# Patient Record
Sex: MALE | Race: WHITE | URBAN - METROPOLITAN AREA
[De-identification: names, ages, dates, MRNs, and addresses within clinical notes are randomized per-mention and may not be internally consistent; named-entity substitution may affect disease eponyms.]

---

## 2021-01-03 ENCOUNTER — HOSPITAL ENCOUNTER (INPATIENT)
Age: 46
LOS: 4 days | Discharge: HOME | DRG: 720 | End: 2021-01-07
Attending: EMERGENCY MEDICINE | Admitting: FAMILY MEDICINE
Payer: MEDICAID

## 2021-01-03 ENCOUNTER — APPOINTMENT (OUTPATIENT)
Dept: CT IMAGING | Age: 46
DRG: 720 | End: 2021-01-03
Payer: MEDICAID

## 2021-01-03 ENCOUNTER — APPOINTMENT (OUTPATIENT)
Dept: GENERAL RADIOLOGY | Age: 46
DRG: 720 | End: 2021-01-03
Payer: MEDICAID

## 2021-01-03 DIAGNOSIS — N30.01 ACUTE HEMORRHAGIC CYSTITIS: Primary | ICD-10-CM

## 2021-01-03 DIAGNOSIS — K20.90 ESOPHAGITIS: ICD-10-CM

## 2021-01-03 DIAGNOSIS — N17.9 ACUTE RENAL FAILURE, UNSPECIFIED ACUTE RENAL FAILURE TYPE (HCC): ICD-10-CM

## 2021-01-03 DIAGNOSIS — F10.11 HISTORY OF ALCOHOL ABUSE: ICD-10-CM

## 2021-01-03 DIAGNOSIS — R19.5 HEME POSITIVE STOOL: ICD-10-CM

## 2021-01-03 LAB
A/G RATIO: 0.9 (ref 1.1–2.2)
ALBUMIN SERPL-MCNC: 3.4 G/DL (ref 3.4–5)
ALP BLD-CCNC: 65 U/L (ref 40–129)
ALT SERPL-CCNC: 13 U/L (ref 10–40)
ANION GAP SERPL CALCULATED.3IONS-SCNC: 13 MMOL/L (ref 3–16)
AST SERPL-CCNC: 14 U/L (ref 15–37)
ATYPICAL LYMPHOCYTE RELATIVE PERCENT: 4 % (ref 0–6)
BACTERIA: ABNORMAL /HPF
BANDED NEUTROPHILS RELATIVE PERCENT: 2 % (ref 0–7)
BASOPHILS ABSOLUTE: 0 K/UL (ref 0–0.2)
BASOPHILS RELATIVE PERCENT: 0 %
BILIRUB SERPL-MCNC: 0.3 MG/DL (ref 0–1)
BILIRUBIN URINE: ABNORMAL
BLOOD, URINE: ABNORMAL
BUN BLDV-MCNC: 34 MG/DL (ref 7–20)
CALCIUM SERPL-MCNC: 9.2 MG/DL (ref 8.3–10.6)
CHLORIDE BLD-SCNC: 96 MMOL/L (ref 99–110)
CLARITY: ABNORMAL
CO2: 25 MMOL/L (ref 21–32)
COARSE CASTS, UA: ABNORMAL /LPF (ref 0–2)
COLOR: ABNORMAL
CREAT SERPL-MCNC: 2.4 MG/DL (ref 0.9–1.3)
EOSINOPHILS ABSOLUTE: 0.2 K/UL (ref 0–0.6)
EOSINOPHILS RELATIVE PERCENT: 1 %
EPITHELIAL CELLS, UA: ABNORMAL /HPF (ref 0–5)
GFR AFRICAN AMERICAN: 36
GFR NON-AFRICAN AMERICAN: 29
GLOBULIN: 3.7 G/DL
GLUCOSE BLD-MCNC: 99 MG/DL (ref 70–99)
GLUCOSE URINE: NEGATIVE MG/DL
HCT VFR BLD CALC: 44.9 % (ref 40.5–52.5)
HEMATOLOGY PATH CONSULT: YES
HEMOGLOBIN: 15.1 G/DL (ref 13.5–17.5)
INR BLD: 1.2 (ref 0.86–1.14)
KETONES, URINE: NEGATIVE MG/DL
LACTIC ACID: 0.9 MMOL/L (ref 0.4–2)
LEUKOCYTE ESTERASE, URINE: NEGATIVE
LYMPHOCYTES ABSOLUTE: 2.1 K/UL (ref 1–5.1)
LYMPHOCYTES RELATIVE PERCENT: 10 %
MCH RBC QN AUTO: 31.1 PG (ref 26–34)
MCHC RBC AUTO-ENTMCNC: 33.6 G/DL (ref 31–36)
MCV RBC AUTO: 92.7 FL (ref 80–100)
MICROSCOPIC EXAMINATION: YES
MONOCYTES ABSOLUTE: 2.4 K/UL (ref 0–1.3)
MONOCYTES RELATIVE PERCENT: 16 %
MYELOCYTE PERCENT: 1 %
NEUTROPHILS ABSOLUTE: 10.4 K/UL (ref 1.7–7.7)
NEUTROPHILS RELATIVE PERCENT: 66 %
NITRITE, URINE: NEGATIVE
OCCULT BLOOD DIAGNOSTIC: ABNORMAL
PDW BLD-RTO: 13.8 % (ref 12.4–15.4)
PH UA: 5 (ref 5–8)
PLATELET # BLD: 190 K/UL (ref 135–450)
PLATELET SLIDE REVIEW: ADEQUATE
PMV BLD AUTO: 8.2 FL (ref 5–10.5)
POTASSIUM REFLEX MAGNESIUM: 3.8 MMOL/L (ref 3.5–5.1)
PROTEIN UA: >=300 MG/DL
PROTHROMBIN TIME: 14 SEC (ref 10–13.2)
RBC # BLD: 4.84 M/UL (ref 4.2–5.9)
RBC # BLD: NORMAL 10*6/UL
RBC UA: >100 /HPF (ref 0–4)
SLIDE REVIEW: ABNORMAL
SODIUM BLD-SCNC: 134 MMOL/L (ref 136–145)
SPECIFIC GRAVITY UA: >=1.03 (ref 1–1.03)
TOTAL PROTEIN: 7.1 G/DL (ref 6.4–8.2)
URINE REFLEX TO CULTURE: YES
URINE TYPE: ABNORMAL
UROBILINOGEN, URINE: 1 E.U./DL
WBC # BLD: 15 K/UL (ref 4–11)
WBC UA: ABNORMAL /HPF (ref 0–5)

## 2021-01-03 PROCEDURE — 6360000002 HC RX W HCPCS: Performed by: EMERGENCY MEDICINE

## 2021-01-03 PROCEDURE — 2580000003 HC RX 258: Performed by: FAMILY MEDICINE

## 2021-01-03 PROCEDURE — 1200000000 HC SEMI PRIVATE

## 2021-01-03 PROCEDURE — 85610 PROTHROMBIN TIME: CPT

## 2021-01-03 PROCEDURE — 94760 N-INVAS EAR/PLS OXIMETRY 1: CPT

## 2021-01-03 PROCEDURE — G0328 FECAL BLOOD SCRN IMMUNOASSAY: HCPCS

## 2021-01-03 PROCEDURE — 87040 BLOOD CULTURE FOR BACTERIA: CPT

## 2021-01-03 PROCEDURE — 96374 THER/PROPH/DIAG INJ IV PUSH: CPT

## 2021-01-03 PROCEDURE — 80053 COMPREHEN METABOLIC PANEL: CPT

## 2021-01-03 PROCEDURE — 6370000000 HC RX 637 (ALT 250 FOR IP): Performed by: FAMILY MEDICINE

## 2021-01-03 PROCEDURE — 6370000000 HC RX 637 (ALT 250 FOR IP): Performed by: EMERGENCY MEDICINE

## 2021-01-03 PROCEDURE — 2580000003 HC RX 258: Performed by: EMERGENCY MEDICINE

## 2021-01-03 PROCEDURE — 6360000002 HC RX W HCPCS: Performed by: FAMILY MEDICINE

## 2021-01-03 PROCEDURE — 74176 CT ABD & PELVIS W/O CONTRAST: CPT

## 2021-01-03 PROCEDURE — 85025 COMPLETE CBC W/AUTO DIFF WBC: CPT

## 2021-01-03 PROCEDURE — 87086 URINE CULTURE/COLONY COUNT: CPT

## 2021-01-03 PROCEDURE — 99283 EMERGENCY DEPT VISIT LOW MDM: CPT

## 2021-01-03 PROCEDURE — 83605 ASSAY OF LACTIC ACID: CPT

## 2021-01-03 PROCEDURE — 96375 TX/PRO/DX INJ NEW DRUG ADDON: CPT

## 2021-01-03 PROCEDURE — 71045 X-RAY EXAM CHEST 1 VIEW: CPT

## 2021-01-03 PROCEDURE — 81001 URINALYSIS AUTO W/SCOPE: CPT

## 2021-01-03 PROCEDURE — C9113 INJ PANTOPRAZOLE SODIUM, VIA: HCPCS | Performed by: EMERGENCY MEDICINE

## 2021-01-03 RX ORDER — SODIUM CHLORIDE 0.9 % (FLUSH) 0.9 %
10 SYRINGE (ML) INJECTION EVERY 12 HOURS SCHEDULED
Status: DISCONTINUED | OUTPATIENT
Start: 2021-01-03 | End: 2021-01-07 | Stop reason: HOSPADM

## 2021-01-03 RX ORDER — ACETAMINOPHEN 325 MG/1
650 TABLET ORAL EVERY 6 HOURS PRN
Status: DISCONTINUED | OUTPATIENT
Start: 2021-01-03 | End: 2021-01-07 | Stop reason: HOSPADM

## 2021-01-03 RX ORDER — 0.9 % SODIUM CHLORIDE 0.9 %
1000 INTRAVENOUS SOLUTION INTRAVENOUS ONCE
Status: COMPLETED | OUTPATIENT
Start: 2021-01-03 | End: 2021-01-03

## 2021-01-03 RX ORDER — ACETAMINOPHEN 650 MG/1
650 SUPPOSITORY RECTAL EVERY 6 HOURS PRN
Status: DISCONTINUED | OUTPATIENT
Start: 2021-01-03 | End: 2021-01-07 | Stop reason: HOSPADM

## 2021-01-03 RX ORDER — POLYETHYLENE GLYCOL 3350 17 G/17G
17 POWDER, FOR SOLUTION ORAL DAILY PRN
Status: DISCONTINUED | OUTPATIENT
Start: 2021-01-03 | End: 2021-01-07 | Stop reason: HOSPADM

## 2021-01-03 RX ORDER — PROMETHAZINE HYDROCHLORIDE 25 MG/1
12.5 TABLET ORAL EVERY 6 HOURS PRN
Status: DISCONTINUED | OUTPATIENT
Start: 2021-01-03 | End: 2021-01-07 | Stop reason: HOSPADM

## 2021-01-03 RX ORDER — PANTOPRAZOLE SODIUM 40 MG/10ML
40 INJECTION, POWDER, LYOPHILIZED, FOR SOLUTION INTRAVENOUS DAILY
Status: DISCONTINUED | OUTPATIENT
Start: 2021-01-04 | End: 2021-01-05

## 2021-01-03 RX ORDER — SODIUM CHLORIDE 9 MG/ML
INJECTION, SOLUTION INTRAVENOUS CONTINUOUS
Status: DISCONTINUED | OUTPATIENT
Start: 2021-01-03 | End: 2021-01-07 | Stop reason: HOSPADM

## 2021-01-03 RX ORDER — MULTIVITAMIN WITH IRON
1 TABLET ORAL DAILY
Status: DISCONTINUED | OUTPATIENT
Start: 2021-01-03 | End: 2021-01-07 | Stop reason: HOSPADM

## 2021-01-03 RX ORDER — SODIUM CHLORIDE 9 MG/ML
INJECTION, SOLUTION INTRAVENOUS CONTINUOUS
Status: DISCONTINUED | OUTPATIENT
Start: 2021-01-03 | End: 2021-01-04

## 2021-01-03 RX ORDER — SODIUM CHLORIDE 0.9 % (FLUSH) 0.9 %
10 SYRINGE (ML) INJECTION PRN
Status: DISCONTINUED | OUTPATIENT
Start: 2021-01-03 | End: 2021-01-07 | Stop reason: HOSPADM

## 2021-01-03 RX ORDER — ONDANSETRON 2 MG/ML
4 INJECTION INTRAMUSCULAR; INTRAVENOUS EVERY 6 HOURS PRN
Status: DISCONTINUED | OUTPATIENT
Start: 2021-01-03 | End: 2021-01-03

## 2021-01-03 RX ORDER — CEPHALEXIN 500 MG/1
500 CAPSULE ORAL 3 TIMES DAILY
COMMUNITY

## 2021-01-03 RX ORDER — ACETAMINOPHEN 325 MG/1
650 TABLET ORAL ONCE
Status: COMPLETED | OUTPATIENT
Start: 2021-01-03 | End: 2021-01-03

## 2021-01-03 RX ORDER — ONDANSETRON 2 MG/ML
4 INJECTION INTRAMUSCULAR; INTRAVENOUS EVERY 6 HOURS PRN
Status: DISCONTINUED | OUTPATIENT
Start: 2021-01-03 | End: 2021-01-07 | Stop reason: HOSPADM

## 2021-01-03 RX ORDER — PANTOPRAZOLE SODIUM 40 MG/10ML
40 INJECTION, POWDER, LYOPHILIZED, FOR SOLUTION INTRAVENOUS ONCE
Status: COMPLETED | OUTPATIENT
Start: 2021-01-03 | End: 2021-01-03

## 2021-01-03 RX ADMIN — PANTOPRAZOLE SODIUM 40 MG: 40 INJECTION, POWDER, FOR SOLUTION INTRAVENOUS at 18:24

## 2021-01-03 RX ADMIN — Medication 1 G: at 18:30

## 2021-01-03 RX ADMIN — ONDANSETRON 4 MG: 2 INJECTION INTRAMUSCULAR; INTRAVENOUS at 16:01

## 2021-01-03 RX ADMIN — ACETAMINOPHEN 650 MG: 325 TABLET ORAL at 15:46

## 2021-01-03 RX ADMIN — SODIUM CHLORIDE 125 ML/HR: 9 INJECTION, SOLUTION INTRAVENOUS at 18:40

## 2021-01-03 RX ADMIN — ENOXAPARIN SODIUM 40 MG: 40 INJECTION SUBCUTANEOUS at 21:42

## 2021-01-03 RX ADMIN — SODIUM CHLORIDE 1000 ML: 9 INJECTION, SOLUTION INTRAVENOUS at 16:00

## 2021-01-03 RX ADMIN — SODIUM CHLORIDE: 9 INJECTION, SOLUTION INTRAVENOUS at 21:38

## 2021-01-03 RX ADMIN — THERA TABS 1 TABLET: TAB at 21:39

## 2021-01-03 ASSESSMENT — PAIN SCALES - GENERAL
PAINLEVEL_OUTOF10: 0
PAINLEVEL_OUTOF10: 0

## 2021-01-03 NOTE — ED NOTES
Bedside report given top [employer], pt. Transported to floor.      Anai Ruelas RN  01/03/21 8480

## 2021-01-03 NOTE — ED PROVIDER NOTES
EMERGENCY DEPARTMENT PROVIDER NOTE    Patient Identification  Pt Name: Je Alvares  MRN: [id number]  [place] 1975  Date of evaluation: 1/3/2021  Provider: Alden Teresa DO  PCP: No primary care provider on file. Chief Complaint  Fever (pt states he has been having fever since Monday. Today pt noticed his urine is dark and his stools are tarry. denies pain. pt states he has been taking motrin and aleve)      HPI  (History provided by patient)  This is a 41 y.o. male with pertinent past medical history of hypertension who was brought in by self for subjective fever ongoing for the past 6 days. Starting yesterday patient reported having blood in his urine, he went to an urgent care and was prescribed cephalexin which she has been taking without improvement. Nothing seems to make his symptoms any better or worse. Associated with dark stools patient noted today, as well as nausea and nonbloody vomiting. Patient states has been taking ibuprofen for his fevers over the past several days. Also reports history of heavy alcohol use, denies prior withdrawal symptoms. He denies any chest or abdominal pain. ROS    Const:  +fevers, no chills, no generalized weakness   Skin:  No rash, no lesions  Eyes:  No visual changes, no blurry or double vision, no pain  ENT:  No sore throat, no difficulty swallowing, no ear pain, no sinus pain or congestion  Card:  No chest pain, no palpitations, no edema  Resp:  No shortness of breath, no cough, no wheezing  Abd:  No abdominal pain, +nausea, +vomiting, no diarrhea, +tarry stools  Genitourinary:  No dysuria, +hematuria  MSK:  No joint pain, no myalgia  Neuro:  No focal weakness, no headache, no paresthesia    All other systems reviewed and negative unless otherwise noted in HPI        I have reviewed the following nursing documentation:  Allergies: Patient has no known allergies.     Past medical history:   Past Medical History:   Diagnosis Date    Hypertension Past surgical history: History reviewed. No pertinent surgical history. Home medications:   Previous Medications    CEPHALEXIN (KEFLEX) 500 MG CAPSULE    Take 500 mg by mouth 3 times daily    MULTIVITAMIN-IRON-MINERALS-FOLIC ACID (CENTRUM) CHEWABLE TABLET    Take 1 tablet by mouth daily    ONDANSETRON (ZOFRAN) 4 MG TABLET    Take 1 tablet by mouth every 8 hours as needed for Nausea or Vomiting       Social history:  reports that he has been smoking cigarettes. He has a 25.00 pack-year smoking history. He has never used smokeless tobacco. He reports current alcohol use of about 12.0 standard drinks of alcohol per week. He reports that he does not use drugs. Family history:  History reviewed. No pertinent family history. Exam  ED Triage Vitals [01/03/21 1241]   BP Temp Temp Source Pulse Resp SpO2 Height Weight   (!) 151/102 98.5 °F (36.9 °C) Oral 107 18 96 % 5' 9\" (1.753 m) 170 lb (77.1 kg)     Nursing note and vitals reviewed. Constitutional: Well developed, well nourished. Non-toxic in appearance. HENT:      Head: Normocephalic and atraumatic. Ears: External ears normal.      Nose: Nose normal.     Mouth: Membrane mucosa tacky and pink. Eyes: Anicteric sclera. No discharge. Neck: Supple. Trachea midline. Cardiovascular: Tachycardia, regular rhythm; no murmurs, rubs, or gallops. Pulmonary/Chest: Effort normal. No respiratory distress. CTAB. No stridor. No wheezes. No rales. Abdominal: Soft. No distension. Nontender to deep palpation all quadrants. No CVA tenderness. Rectal: Normal rectal tone, nontender, dark green stool, no hematochezia, Jaja Reese RN as chaperone  Musculoskeletal: Moves all extremities. No gross deformity. Neurological: Alert and oriented. Face symmetric. Speech is clear. Skin: Warm and dry. No rash. Psychiatric: Normal mood and affect.  Behavior is normal.              Radiology  CT ABDOMEN PELVIS WO CONTRAST Additional Contrast? None   Final Result   No acute intra-abdominal or pelvic abnormality. Persistent circumferential thickening of the distal esophagus may be   secondary to a soft a giant is. A neoplastic abnormality cannot be excluded   and direct visualization or esophagram is recommended for further evaluation. Colonic diverticulosis. No evidence of diverticulitis         XR CHEST PORTABLE   Final Result   No acute cardiopulmonary disease.              Labs  Results for orders placed or performed during the hospital encounter of 01/03/21   Urinalysis Reflex to Culture    Specimen: Urine, clean catch   Result Value Ref Range    Color, UA Brown Straw/Yellow    Clarity, UA CLOUDY (A) Clear    Glucose, Ur Negative Negative mg/dL    Bilirubin Urine MODERATE (A) Negative    Ketones, Urine Negative Negative mg/dL    Specific Gravity, UA >=1.030 1.005 - 1.030    Blood, Urine LARGE (A) Negative    pH, UA 5.0 5.0 - 8.0    Protein, UA >=300 (A) Negative mg/dL    Urobilinogen, Urine 1.0 <2.0 E.U./dL    Nitrite, Urine Negative Negative    Leukocyte Esterase, Urine Negative Negative    Microscopic Examination YES     Urine Type Voided     Urine Reflex to Culture Yes    Microscopic Urinalysis   Result Value Ref Range    Coarse Casts, UA 6-10 (A) 0 - 2 /LPF    WBC, UA 10-20 (A) 0 - 5 /HPF    RBC, UA >100 (A) 0 - 4 /HPF    Epithelial Cells, UA 2-5 0 - 5 /HPF    Bacteria, UA 3+ (A) None Seen /HPF   CBC Auto Differential   Result Value Ref Range    WBC 15.0 (H) 4.0 - 11.0 K/uL    RBC 4.84 4.20 - 5.90 M/uL    Hemoglobin 15.1 13.5 - 17.5 g/dL    Hematocrit 44.9 40.5 - 52.5 %    MCV 92.7 80.0 - 100.0 fL    MCH 31.1 26.0 - 34.0 pg    MCHC 33.6 31.0 - 36.0 g/dL    RDW 13.8 12.4 - 15.4 %    Platelets 551 083 - 595 K/uL    MPV 8.2 5.0 - 10.5 fL    PLATELET SLIDE REVIEW Adequate     SLIDE REVIEW see below     Path Consult Yes     Neutrophils % 66.0 %    Lymphocytes % 10.0 %    Monocytes % 16.0 %    Eosinophils % 1.0 %    Basophils % 0.0 %    Neutrophils Absolute 10.4 (H) 1.7 - 7.7 K/uL    Lymphocytes Absolute 2.1 1.0 - 5.1 K/uL    Monocytes Absolute 2.4 (H) 0.0 - 1.3 K/uL    Eosinophils Absolute 0.2 0.0 - 0.6 K/uL    Basophils Absolute 0.0 0.0 - 0.2 K/uL    Bands Relative 2 0 - 7 %    Atypical Lymphocytes Relative 4 0 - 6 %    Myelocyte Percent 1 (A) %    RBC Morphology Normal    Comprehensive Metabolic Panel w/ Reflex to MG   Result Value Ref Range    Sodium 134 (L) 136 - 145 mmol/L    Potassium reflex Magnesium 3.8 3.5 - 5.1 mmol/L    Chloride 96 (L) 99 - 110 mmol/L    CO2 25 21 - 32 mmol/L    Anion Gap 13 3 - 16    Glucose 99 70 - 99 mg/dL    BUN 34 (H) 7 - 20 mg/dL    CREATININE 2.4 (H) 0.9 - 1.3 mg/dL    GFR Non-African American 29 (A) >60    GFR  36 (A) >60    Calcium 9.2 8.3 - 10.6 mg/dL    Total Protein 7.1 6.4 - 8.2 g/dL    Alb 3.4 3.4 - 5.0 g/dL    Albumin/Globulin Ratio 0.9 (L) 1.1 - 2.2    Total Bilirubin 0.3 0.0 - 1.0 mg/dL    Alkaline Phosphatase 65 40 - 129 U/L    ALT 13 10 - 40 U/L    AST 14 (L) 15 - 37 U/L    Globulin 3.7 g/dL   Protime-INR   Result Value Ref Range    Protime 14.0 (H) 10.0 - 13.2 sec    INR 1.20 (H) 0.86 - 1.14   Lactic Acid, Plasma   Result Value Ref Range    Lactic Acid 0.9 0.4 - 2.0 mmol/L   Blood occult stool #1   Result Value Ref Range    Occult Blood Diagnostic Result: POSITIVE  Normal range: Negative   (A)        Screenings           MDM and ED Course    Patient afebrile and nontoxic. No distress. Abdomen is completely benign to palpation, no findings to suggest acute appendicitis, cholecystitis or pancreatitis and these are felt clinically unlikely diagnoses. Chest x-ray evidence of pneumothorax, infiltrate or other acute process. UA does show hematuria with elevated WBCs, potentially hemorrhagic cystitis although other etiology of hematuria cannot be excluded. No hematochezia on rectal exam, stools are not melanotic, however did return Hemoccult positive.   Laboratory work-up without evidence of anemia, brisk GI bleed is not suspected and there is no indication for PRBC transfusion at this time. Labs also with acute kidney injury, likely prerenal, no clinically significant electrolyte abnormalities. Given his hematuria and numerous lab abnormalities did proceed with CT scan of the abdomen which shows findings consistent with most likely esophagitis, no other acute process, EGD recommended to further characterize. Patient received IV fluid resuscitation, Protonix and ceftriaxone. Case discussed with internal medicine team and will admit for further evaluation and care. Recommend close monitoring of hemoglobin. Final Impression  1. Acute hemorrhagic cystitis    2. Acute renal failure, unspecified acute renal failure type ([address]    3. Heme positive stool    4. Esophagitis    5. History of alcohol abuse        Blood pressure (!) 151/102, pulse 107, temperature 98.5 °F (36.9 °C), temperature source Oral, resp. rate 18, height 5' 9\" (1.753 m), weight 170 lb (77.1 kg), SpO2 96 %. Disposition:  DISPOSITION Decision To Admit 01/03/2021 06:17:23 PM      Patient Referrals:  No follow-up provider specified. Discharge Medications:  New Prescriptions    No medications on file       Discontinued Medications:  Discontinued Medications    No medications on file       This chart was generated using the [employer] dictation system. I created this record but it may contain dictation errors given the limitations of this technology.     Ellen Hill DO (electronically signed)  Attending Emergency Physician       Ellen Hill DO  01/04/21 8518

## 2021-01-03 NOTE — H&P
HOSPITALISTS HISTORY AND PHYSICAL    1/3/2021 6:51 PM    Patient Information:  Amara Hill is a 41 y.o. male [id number]  PCP:  No primary care provider on file. (Tel: None )    Chief complaint:    Chief Complaint   Patient presents with    Fever     pt states he has been having fever since Monday. Today pt noticed his urine is dark and his stools are tarry. denies pain. pt states he has been taking motrin and aleve        History of Present Illness:  Geeta Macias is a 41 y.o. male iwith current daily alcohol use, nicotine use, HTN Presents with c/o intermittent  fever since Monday. He also noticed blood in the urine and dark tarry stool. He was seen at urgent care and stared on Keflex for possible urinary tract infection. UA showed UTI. CT abdomen showed esophageal wall thickening. takes motrin for pain . No recent scope    REVIEW OF SYSTEMS:   Constitutional: +Ve for fever,chills or night sweats  ENT: Negative for rhinorrhea, epistaxis, hoarseness, sore throat. Respiratory: Negative for shortness of breath,wheezing  Cardiovascular: Negative for chest pain, palpitations   Gastrointestinal: Negative for nausea, vomiting, diarrhea  Genitourinary: Negative for polyuria, dysuria   Hematologic/Lymphatic: Negative for bleeding tendency, easy bruising  Musculoskeletal: Negative for myalgias and arthralgias  Neurologic: Negative for confusion,dysarthria. Skin: Negative for itching,rash  Psychiatric: Negative for depression,anxiety, agitation. Endocrine: Negative for polydipsia,polyuria,heat /cold intolerance. Past Medical History:   has a past medical history of Hypertension. Past Surgical History:   has no past surgical history on file. Medications:  No current facility-administered medications on file prior to encounter.       Current Outpatient Medications on File Prior to Encounter   Medication Sig Dispense Refill    cephALEXin (KEFLEX) 500 MG capsule Take 500 mg by mouth 3 times daily      multivitamin-iron-minerals-folic acid (CENTRUM) chewable tablet Take 1 tablet by mouth daily      ondansetron (ZOFRAN) 4 MG tablet Take 1 tablet by mouth every 8 hours as needed for Nausea or Vomiting 12 tablet 0       Allergies:  No Known Allergies     Social History:   reports that he has been smoking cigarettes. He has a 25.00 pack-year smoking history. He has never used smokeless tobacco. He reports current alcohol use of about 12.0 standard drinks of alcohol per week. He reports that he does not use drugs. Family History:  family history is not on file. , family history reviewed not pertinent to current admission    Physical Exam:  BP (!) 155/96   Pulse 88   Temp 98.5 °F (36.9 °C) (Oral)   Resp 16   Ht 5' 9\" (1.753 m)   Wt 170 lb (77.1 kg)   SpO2 96%   BMI 25.10 kg/m²     General appearance:  Appears comfortable. Well nourished  Eyes: Sclera clear, pupils equal  ENT: Moist mucus membranes, no thrush. Trachea midline. Cardiovascular: Regular rhythm, normal S1, S2. No murmur, gallop, rub. No edema in lower extremities  Respiratory: Clear to auscultation bilaterally, no wheeze, good inspiratory effort  Gastrointestinal: Abdomen soft, non-tender, not distended, normal bowel sounds  Musculoskeletal: No cyanosis in digits, neck supple  Neurology: Cranial nerves grossly intact. Alert and oriented in time, place and person. No speech or motor deficits  Psychiatry: Appropriate affect.  Not agitated  Skin: Warm, dry, normal turgor, no rash    Labs:  CBC:   Lab Results   Component Value Date    WBC 15.0 01/03/2021    RBC 4.84 01/03/2021    HGB 15.1 01/03/2021    HCT 44.9 01/03/2021    MCV 92.7 01/03/2021    MCH 31.1 01/03/2021    MCHC 33.6 01/03/2021    RDW 13.8 01/03/2021     01/03/2021    MPV 8.2 01/03/2021     BMP:    Lab Results   Component Value Date     01/03/2021    K 3.8 01/03/2021    CL 96 01/03/2021    CO2 25 01/03/2021    BUN 34 01/03/2021    CREATININE 2.4 01/03/2021    CALCIUM 9.2 01/03/2021    GFRAA 36 01/03/2021    LABGLOM 29 01/03/2021    GLUCOSE 99 01/03/2021       Chest Xray:   EKG:        Problem List  Active Problems:    RACIEL (acute kidney injury) ([facility] [address]  Resolved Problems:    * No resolved hospital problems. *        Assessment/Plan:         1. RACIEL likely d/t dehydration  Cr up to 2.4  Cont to hydrate  Avoid nephrotoxins  Cont to monitor    Hematochezia  Source likely upper GI  CT abdomen showed esophageal thickening  Started on protonix  Cont to monitor h/h   Hb is 15 at this time  NPO past midnight  GI consulted     Hematuria concern for cystitis  Cultures pending  Will treat with abx for now    etoh use  Cont multivitamin  etoh level pending    Admit as inpatient. I anticipate hospitalization spanning more than two midnights for investigation and treatment of the above medically necessary diagnoses.       Theresa Barrera MD    1/3/2021 6:51 PM

## 2021-01-04 PROBLEM — F10.10 ALCOHOL ABUSE, CONTINUOUS: Status: ACTIVE | Noted: 2021-01-04

## 2021-01-04 PROBLEM — K92.1 MELENA: Status: ACTIVE | Noted: 2021-01-04

## 2021-01-04 PROBLEM — R50.9 FEVER: Status: ACTIVE | Noted: 2021-01-04

## 2021-01-04 PROBLEM — K22.89 ESOPHAGEAL THICKENING: Status: ACTIVE | Noted: 2021-01-04

## 2021-01-04 PROBLEM — Z72.0 TOBACCO ABUSE: Status: ACTIVE | Noted: 2021-01-04

## 2021-01-04 LAB
AMPHETAMINE SCREEN, URINE: NORMAL
ANION GAP SERPL CALCULATED.3IONS-SCNC: 11 MMOL/L (ref 3–16)
BARBITURATE SCREEN URINE: NORMAL
BASOPHILS ABSOLUTE: 0 K/UL (ref 0–0.2)
BASOPHILS RELATIVE PERCENT: 0.4 %
BENZODIAZEPINE SCREEN, URINE: NORMAL
BUN BLDV-MCNC: 32 MG/DL (ref 7–20)
CALCIUM SERPL-MCNC: 8.3 MG/DL (ref 8.3–10.6)
CANNABINOID SCREEN URINE: NORMAL
CHLORIDE BLD-SCNC: 103 MMOL/L (ref 99–110)
CO2: 23 MMOL/L (ref 21–32)
COCAINE METABOLITE SCREEN URINE: NORMAL
CREAT SERPL-MCNC: 2.1 MG/DL (ref 0.9–1.3)
EOSINOPHILS ABSOLUTE: 0.1 K/UL (ref 0–0.6)
EOSINOPHILS RELATIVE PERCENT: 1.2 %
GFR AFRICAN AMERICAN: 42
GFR NON-AFRICAN AMERICAN: 34
GLUCOSE BLD-MCNC: 93 MG/DL (ref 70–99)
HCT VFR BLD CALC: 39.7 % (ref 40.5–52.5)
HEMATOLOGY PATH CONSULT: NORMAL
HEMOGLOBIN: 13.1 G/DL (ref 13.5–17.5)
LACTIC ACID, SEPSIS: 0.7 MMOL/L (ref 0.4–1.9)
LYMPHOCYTES ABSOLUTE: 1.2 K/UL (ref 1–5.1)
LYMPHOCYTES RELATIVE PERCENT: 11.7 %
Lab: NORMAL
MCH RBC QN AUTO: 30.7 PG (ref 26–34)
MCHC RBC AUTO-ENTMCNC: 32.9 G/DL (ref 31–36)
MCV RBC AUTO: 93.5 FL (ref 80–100)
METHADONE SCREEN, URINE: NORMAL
MONOCYTES ABSOLUTE: 1.4 K/UL (ref 0–1.3)
MONOCYTES RELATIVE PERCENT: 13 %
NEUTROPHILS ABSOLUTE: 7.8 K/UL (ref 1.7–7.7)
NEUTROPHILS RELATIVE PERCENT: 73.7 %
OPIATE SCREEN URINE: NORMAL
OXYCODONE URINE: NORMAL
PDW BLD-RTO: 13.6 % (ref 12.4–15.4)
PH UA: 7
PHENCYCLIDINE SCREEN URINE: NORMAL
PLATELET # BLD: 183 K/UL (ref 135–450)
PMV BLD AUTO: 7.8 FL (ref 5–10.5)
POTASSIUM SERPL-SCNC: 4.1 MMOL/L (ref 3.5–5.1)
PROPOXYPHENE SCREEN: NORMAL
RBC # BLD: 4.25 M/UL (ref 4.2–5.9)
SARS-COV-2, NAAT: NOT DETECTED
SODIUM BLD-SCNC: 137 MMOL/L (ref 136–145)
URINE CULTURE, ROUTINE: NORMAL
WBC # BLD: 10.6 K/UL (ref 4–11)

## 2021-01-04 PROCEDURE — 80307 DRUG TEST PRSMV CHEM ANLYZR: CPT

## 2021-01-04 PROCEDURE — 94760 N-INVAS EAR/PLS OXIMETRY 1: CPT

## 2021-01-04 PROCEDURE — C9113 INJ PANTOPRAZOLE SODIUM, VIA: HCPCS | Performed by: FAMILY MEDICINE

## 2021-01-04 PROCEDURE — U0002 COVID-19 LAB TEST NON-CDC: HCPCS

## 2021-01-04 PROCEDURE — 83605 ASSAY OF LACTIC ACID: CPT

## 2021-01-04 PROCEDURE — 1200000000 HC SEMI PRIVATE

## 2021-01-04 PROCEDURE — 36415 COLL VENOUS BLD VENIPUNCTURE: CPT

## 2021-01-04 PROCEDURE — 6360000002 HC RX W HCPCS: Performed by: FAMILY MEDICINE

## 2021-01-04 PROCEDURE — 6360000002 HC RX W HCPCS: Performed by: INTERNAL MEDICINE

## 2021-01-04 PROCEDURE — 80048 BASIC METABOLIC PNL TOTAL CA: CPT

## 2021-01-04 PROCEDURE — 85025 COMPLETE CBC W/AUTO DIFF WBC: CPT

## 2021-01-04 PROCEDURE — 2580000003 HC RX 258: Performed by: FAMILY MEDICINE

## 2021-01-04 RX ORDER — LORAZEPAM 1 MG/1
2 TABLET ORAL
Status: DISCONTINUED | OUTPATIENT
Start: 2021-01-04 | End: 2021-01-07 | Stop reason: HOSPADM

## 2021-01-04 RX ORDER — SODIUM CHLORIDE 0.9 % (FLUSH) 0.9 %
10 SYRINGE (ML) INJECTION PRN
Status: DISCONTINUED | OUTPATIENT
Start: 2021-01-04 | End: 2021-01-07 | Stop reason: HOSPADM

## 2021-01-04 RX ORDER — LORAZEPAM 2 MG/ML
4 INJECTION INTRAMUSCULAR
Status: DISCONTINUED | OUTPATIENT
Start: 2021-01-04 | End: 2021-01-07 | Stop reason: HOSPADM

## 2021-01-04 RX ORDER — LORAZEPAM 2 MG/ML
1 INJECTION INTRAMUSCULAR
Status: DISCONTINUED | OUTPATIENT
Start: 2021-01-04 | End: 2021-01-07 | Stop reason: HOSPADM

## 2021-01-04 RX ORDER — LORAZEPAM 2 MG/ML
2 INJECTION INTRAMUSCULAR
Status: DISCONTINUED | OUTPATIENT
Start: 2021-01-04 | End: 2021-01-07 | Stop reason: HOSPADM

## 2021-01-04 RX ORDER — SODIUM CHLORIDE 0.9 % (FLUSH) 0.9 %
10 SYRINGE (ML) INJECTION EVERY 12 HOURS SCHEDULED
Status: DISCONTINUED | OUTPATIENT
Start: 2021-01-04 | End: 2021-01-07 | Stop reason: HOSPADM

## 2021-01-04 RX ORDER — LORAZEPAM 1 MG/1
4 TABLET ORAL
Status: DISCONTINUED | OUTPATIENT
Start: 2021-01-04 | End: 2021-01-07 | Stop reason: HOSPADM

## 2021-01-04 RX ORDER — THIAMINE HYDROCHLORIDE 100 MG/ML
100 INJECTION, SOLUTION INTRAMUSCULAR; INTRAVENOUS DAILY
Status: DISCONTINUED | OUTPATIENT
Start: 2021-01-04 | End: 2021-01-07

## 2021-01-04 RX ORDER — HYDRALAZINE HYDROCHLORIDE 20 MG/ML
10 INJECTION INTRAMUSCULAR; INTRAVENOUS EVERY 4 HOURS PRN
Status: DISCONTINUED | OUTPATIENT
Start: 2021-01-04 | End: 2021-01-07 | Stop reason: HOSPADM

## 2021-01-04 RX ORDER — LORAZEPAM 1 MG/1
1 TABLET ORAL
Status: DISCONTINUED | OUTPATIENT
Start: 2021-01-04 | End: 2021-01-07 | Stop reason: HOSPADM

## 2021-01-04 RX ORDER — LORAZEPAM 2 MG/ML
3 INJECTION INTRAMUSCULAR
Status: DISCONTINUED | OUTPATIENT
Start: 2021-01-04 | End: 2021-01-07 | Stop reason: HOSPADM

## 2021-01-04 RX ORDER — LORAZEPAM 1 MG/1
3 TABLET ORAL
Status: DISCONTINUED | OUTPATIENT
Start: 2021-01-04 | End: 2021-01-07 | Stop reason: HOSPADM

## 2021-01-04 RX ORDER — HALOPERIDOL 5 MG/ML
5 INJECTION INTRAMUSCULAR EVERY 4 HOURS PRN
Status: DISCONTINUED | OUTPATIENT
Start: 2021-01-04 | End: 2021-01-07 | Stop reason: HOSPADM

## 2021-01-04 RX ADMIN — PANTOPRAZOLE SODIUM 40 MG: 40 INJECTION, POWDER, FOR SOLUTION INTRAVENOUS at 08:02

## 2021-01-04 RX ADMIN — THIAMINE HYDROCHLORIDE 100 MG: 100 INJECTION, SOLUTION INTRAMUSCULAR; INTRAVENOUS at 16:19

## 2021-01-04 RX ADMIN — HYDRALAZINE HYDROCHLORIDE 10 MG: 20 INJECTION INTRAMUSCULAR; INTRAVENOUS at 21:50

## 2021-01-04 RX ADMIN — HYDRALAZINE HYDROCHLORIDE 10 MG: 20 INJECTION INTRAMUSCULAR; INTRAVENOUS at 15:13

## 2021-01-04 RX ADMIN — Medication 1 G: at 17:15

## 2021-01-04 RX ADMIN — SODIUM CHLORIDE: 9 INJECTION, SOLUTION INTRAVENOUS at 22:00

## 2021-01-04 RX ADMIN — SODIUM CHLORIDE: 9 INJECTION, SOLUTION INTRAVENOUS at 03:36

## 2021-01-04 ASSESSMENT — PAIN SCALES - GENERAL: PAINLEVEL_OUTOF10: 0

## 2021-01-04 NOTE — PLAN OF CARE
Problem: Bleeding:  Goal: Will show no signs and symptoms of excessive bleeding  Description: Will show no signs and symptoms of excessive bleeding  Outcome: Ongoing     Problem: Daily Care:  Goal: Daily care needs are met  Description: Daily care needs are met  Outcome: Ongoing     Problem: Discharge Planning:  Goal: Patients continuum of care needs are met  Description: Patients continuum of care needs are met  Outcome: Ongoing

## 2021-01-04 NOTE — PROGRESS NOTES
8:13 AM: Morning assessment completed, patient denies needs at this time, call light in reach, will continue to monitor. The care plan and education has been reviewed and mutually agreed upon with the patient. Educated patient on the potential for falls during their hospital stay. Reviewed current fall safety protocol. Reviewed indication for use of bed alarm. Patient verbalized understanding. Patient resting in bedm reports pain in the back of the throat.  NPO pending eval.     11:16 AM EST: COVID rapid test negative, precautions d/c

## 2021-01-04 NOTE — CARE COORDINATION
Discharge Planning Assessment  Readmission risk score 10%t  RN discharge planner met with patient/ (and family member) to discuss reason for admission, current living situation, and potential needs at the time of discharge    Demographics/Insurance verified Yes Address is [address] [place] [address], [address] [place] [address], phone is correct per face sheet. Patient is uninsured, financial counselor has screened for pending Medicaid    Current type of dwellin level with basement, few steps to enter    Patient from ECF/SW confirmed with:n/a    Living arrangements: alone    Level of function/Support: independent    PCP: none    Last Visit to PCP: long time    DME:none    Active with any community resources/agencies/skilled home care:none    Medication compliance issues: uninsured with pending Medicaid, uses [facility] in [facility] issues that could impact healthcare: pending Medicaid        Tentative discharge plan: home    *Discussed and provided facilities of choice if transition to a skilled nursing facility is required at the time of discharge      Discussed with patient and/or family that on the day of discharge home tentative time of discharge will be between 10 AM and noon.     Transportation at the time of discharge: family    IVNONE Barnett, CCM, RN  [facility]  [phone]

## 2021-01-04 NOTE — PROGRESS NOTES
Shift assessment completed, pt is alert and oriented, independent in room, denies any pain, pt is aware of NPO status after MN, see flowsheets and MAR. Will monitor pt. The care plan and education has been reviewed and mutually agreed upon with the patient.

## 2021-01-04 NOTE — CONSULTS
Gastroenterology Consult Note      Patient: Elly Gerard  : 1975  Acct#:      Date:  2021    Subjective:       History of Present Illness  Patient is a 41 y.o.  male admitted with RACIEL (acute kidney injury) ([facility] [address] [N17.9] who is seen in consult for esophagitis and black stool. He began having chills, sweats, and tactile fevers 8 days ago. No cough or SOB or runny nose. Also noted enlarged lymph node in the neck. Had malaise and laid in bed most of the days. Was taking ibuprofen multiple times daily. Noted black stool on Friday. No hematochezia. Would have some nausea and vomiting with changing position in bed but otherwise was eating ok. Emesis was orange then brown. Also noted brown urine so went to urgent care. Was started on antibiotics for UTI. Then came here yesterday. Stool was green on TERI in ED per notes. Past Medical History:   Diagnosis Date    Hypertension       History reviewed. No pertinent surgical history. Past Endoscopic History    Admission Meds  No current facility-administered medications on file prior to encounter. Current Outpatient Medications on File Prior to Encounter   Medication Sig Dispense Refill    cephALEXin (KEFLEX) 500 MG capsule Take 500 mg by mouth 3 times daily      multivitamin-iron-minerals-folic acid (CENTRUM) chewable tablet Take 1 tablet by mouth daily      ondansetron (ZOFRAN) 4 MG tablet Take 1 tablet by mouth every 8 hours as needed for Nausea or Vomiting 12 tablet 0       Allergies  No Known Allergies   Social   Social History     Tobacco Use    Smoking status: Current Every Day Smoker     Packs/day: 1.00     Years: 25.00     Pack years: 25.00     Types: Cigarettes    Smokeless tobacco: Never Used   Substance Use Topics    Alcohol use: Yes     Alcohol/week: 12.0 standard drinks     Types: 12 Shots of liquor per week     Comment: every day Vodka tonics        History reviewed. No pertinent family history.      Review of Systems  Constitutional: + for fevers, chills, sweats    Ears, nose, mouth, throat, and face: negative for nasal congestion and sore throat   Respiratory: negative for cough and shortness of breath   Cardiovascular: negative for chest pain and dyspnea   Gastrointestinal: see hpi   Genitourinary:negative for dysuria and frequency   Integument/breast: negative for pruritus and rash   Hematologic/lymphatic: negative for bleeding and easy bruising   Musculoskeletal:negative for arthralgias and myalgias   Neurological: negative for dizziness and weakness         Physical Exam  Blood pressure (!) 132/96, pulse 84, temperature 98.9 °F (37.2 °C), temperature source Oral, resp. rate 14, height 5' 9\" (1.753 m), weight 170 lb (77.1 kg), SpO2 94 %. General appearance: alert, cooperative, no distress, appears stated age  Eyes: Anicteric  Head: Normocephalic, without obvious abnormality  Lungs: clear to auscultation bilaterally, Normal Effort  Heart: regular rate and rhythm, normal S1 and S2, no murmurs or rubs  Abdomen: soft, non-tender. Bowel sounds normal. No masses,  no organomegaly. Extremities: atraumatic, no cyanosis or edema  Skin: warm and dry, no jaundice  Neuro: Grossly intact, A&OX3  Musculoskeletal: 5/5  strength BUE      Data Review:    Recent Labs     01/03/21  1604   WBC 15.0*   HGB 15.1   HCT 44.9   MCV 92.7        Recent Labs     01/03/21  1604   *   K 3.8   CL 96*   CO2 25   BUN 34*   CREATININE 2.4*     Recent Labs     01/03/21  1604   AST 14*   ALT 13   BILITOT 0.3   ALKPHOS 65     No results for input(s): LIPASE, AMYLASE in the last 72 hours. Recent Labs     01/03/21  1604   PROTIME 14.0*   INR 1.20*     No results for input(s): PTT in the last 72 hours. No results for input(s): OCCULTBLD in the last 72 hours.     Imaging Studies:                 CT-scan of abdomen and pelvis wo contrast 1/3/20  Impression   No acute intra-abdominal or pelvic abnormality.       Persistent circumferential thickening of the distal esophagus may be   secondary to a soft a giant is.  A neoplastic abnormality cannot be excluded   and direct visualization or esophagram is recommended for further evaluation.       Colonic diverticulosis.  No evidence of diverticulitis                          Assessment:     Active Problems:    RACEIL (acute kidney injury) ([address]  Resolved Problems:    * No resolved hospital problems. *    H/o melena - pt reports black stool at home after taking ibuprofen. Could have NSAID induced ulcer that bled. Stool was green in the ED. Initial hgb is normal at 15. Repeat CBC ordered. CT with esophagitis. RACIEL - cr 2.4. Fevers - temp 102 at urgent care. Afebrile here. ?UTI. Recommendations:   - PPI  - repeat CBC and BMP today  - clear liquids  - NPO  - plan EGD tomorrow  - check Covid     Discussed with Dr. Carlos Gonzales, PA-C  [address]    I have personally performed a face to face diagnostic evaluation on this patient. I have interviewed and examined the patient and I agree with the findings and recommended plan of care. In summary, my findings and plan are the followin-year-old  aunt with acute kidney injury and UTI. Patient had 8 day history of fever and chills. He seen in urgent care and was diagnosed with UTI, started on antibiotics. He also took ibuprofen at home. He had black stools since Friday. He has some minor and nausea/vomiting 3 days ago. Patient had a CT scan of the abdomen and pelvis [facility] at [facility] yesterday and it showed distal esophagitis. He is covid negative. Vital signs stable abdomen is soft nontender no rebound or guarding  Impression: Esophagitis on CAT scan. History of melena while on ibuprofen. He has mild anemia on labs.    Plans: IV PPI, EGD tomorrow    Maddy Oliva MD, MSc  [address]

## 2021-01-04 NOTE — PROGRESS NOTES
PRN      enoxaparin (LOVENOX) injection 40 mg, Daily      promethazine (PHENERGAN) tablet 12.5 mg, Q6H PRN    Or      ondansetron (ZOFRAN) injection 4 mg, Q6H PRN      polyethylene glycol (GLYCOLAX) packet 17 g, Daily PRN      acetaminophen (TYLENOL) tablet 650 mg, Q6H PRN    Or      acetaminophen (TYLENOL) suppository 650 mg, Q6H PRN        Objective:  BP (!) 157/103   Pulse 81   Temp 98.2 °F (36.8 °C) (Oral)   Resp 14   Ht 5' 9\" (1.753 m)   Wt 170 lb (77.1 kg)   SpO2 98%   BMI 25.10 kg/m²     Intake/Output Summary (Last 24 hours) at 1/4/2021 2201  Last data filed at 1/4/2021 2150  Gross per 24 hour   Intake 3175 ml   Output 100 ml   Net 3075 ml      Wt Readings from Last 3 Encounters:   01/03/21 170 lb (77.1 kg)   03/04/18 166 lb 10.7 oz (75.6 kg)   05/10/16 170 lb (77.1 kg)       General appearance:  Appears comfortable  Eyes: Sclera clear. Pupils equal.  ENT: Moist oral mucosa. Trachea midline, no adenopathy. Cardiovascular: Regular rhythm, normal S1, S2. No murmur. No edema in lower extremities  Respiratory: Not using accessory muscles. Good inspiratory effort. Clear to auscultation bilaterally, no wheeze or crackles. GI: Abdomen soft, no tenderness, not distended, normal bowel sounds  Musculoskeletal: No cyanosis in digits, neck supple  Neurology: Grossly intact. No speech or motor deficits  Psych: Normal affect. Alert and oriented in time, place and person  Skin: Warm, dry, normal turgor  Extremity exam shows brisk capillary refill.   Peripheral pulses are palpable in lower extremities     Labs and Tests:  CBC:   Recent Labs     01/03/21  1604 01/04/21  1040   WBC 15.0* 10.6   HGB 15.1 13.1*    183     BMP:    Recent Labs     01/03/21  1604 01/04/21  1040   * 137   K 3.8 4.1   CL 96* 103   CO2 25 23   BUN 34* 32*   CREATININE 2.4* 2.1*   GLUCOSE 99 93     Hepatic:   Recent Labs     01/03/21  1604   AST 14*   ALT 13   BILITOT 0.3   ALKPHOS 65     CT ABDOMEN PELVIS WO CONTRAST Additional Contrast? None   Final Result   No acute intra-abdominal or pelvic abnormality. Persistent circumferential thickening of the distal esophagus may be   secondary to a soft a giant is. A neoplastic abnormality cannot be excluded   and direct visualization or esophagram is recommended for further evaluation. Colonic diverticulosis. No evidence of diverticulitis         XR CHEST PORTABLE   Final Result   No acute cardiopulmonary disease. Problem List  Principal Problem:    RACIEL (acute kidney injury) ([address]  Active Problems:    Melena    Esophageal thickening    Alcohol abuse, continuous    Fever    Tobacco abuse  Resolved Problems:    * No resolved hospital problems. *       Assessment & Plan:   1. IVF  2. Cont Rocephin IV  3. NPO p MN for EGD  4. Protonix IV  5. Urology consult for dark urine/?hematuria  6. CIWA Ativan PRN  7. Hydralazine IV PRN  8. Haldol IM PRN agitation    IV Access: Peripheral  Alarcon: No  Diet: DIET CLEAR LIQUID;  Diet NPO, After Midnight  Code:Full Code  DVT PPX Lovenox  Disposition Home    Discussed with patient, nursing and CM. EGD tomorrow. Home in next 48 hrs once RACIEL resolved.       Vilma Scanlon MD   1/4/2021 10:01 PM

## 2021-01-04 NOTE — ACP (ADVANCE CARE PLANNING)
Advanced Care Planning Note. Purpose of Encounter: Advanced care planning in light of RACIEL  Parties In Attendance: Patient  Decisional Capacity: Yes  Subjective: Patient with melena   Objective: Cr 2.1  Goals of Care Determination: Patient wants full support (CPR, vent, surgery, HD, trach, PEG)  Plan:  IVF, IV Abx, IV Protonix, GI and Urology consults  Code Status: Full code   Time spent on Advanced care Plannin minutes  Advanced Care Planning Documents: Completed advanced directives on chart, mother is the POA.     Trupti Harris MD  2021 10:00 AM

## 2021-01-05 ENCOUNTER — ANESTHESIA (OUTPATIENT)
Dept: ENDOSCOPY | Age: 46
DRG: 720 | End: 2021-01-05
Payer: MEDICAID

## 2021-01-05 ENCOUNTER — ANESTHESIA EVENT (OUTPATIENT)
Dept: ENDOSCOPY | Age: 46
DRG: 720 | End: 2021-01-05
Payer: MEDICAID

## 2021-01-05 VITALS
RESPIRATION RATE: 2 BRPM | OXYGEN SATURATION: 97 % | SYSTOLIC BLOOD PRESSURE: 171 MMHG | DIASTOLIC BLOOD PRESSURE: 95 MMHG

## 2021-01-05 LAB
ANION GAP SERPL CALCULATED.3IONS-SCNC: 6 MMOL/L (ref 3–16)
BASOPHILS ABSOLUTE: 0 K/UL (ref 0–0.2)
BASOPHILS RELATIVE PERCENT: 0.5 %
BUN BLDV-MCNC: 24 MG/DL (ref 7–20)
CALCIUM SERPL-MCNC: 8.7 MG/DL (ref 8.3–10.6)
CHLORIDE BLD-SCNC: 105 MMOL/L (ref 99–110)
CO2: 28 MMOL/L (ref 21–32)
CREAT SERPL-MCNC: 1.8 MG/DL (ref 0.9–1.3)
EOSINOPHILS ABSOLUTE: 0.2 K/UL (ref 0–0.6)
EOSINOPHILS RELATIVE PERCENT: 2.3 %
GFR AFRICAN AMERICAN: 50
GFR NON-AFRICAN AMERICAN: 41
GLUCOSE BLD-MCNC: 94 MG/DL (ref 70–99)
HCT VFR BLD CALC: 37.6 % (ref 40.5–52.5)
HEMOGLOBIN: 12.2 G/DL (ref 13.5–17.5)
LYMPHOCYTES ABSOLUTE: 1.2 K/UL (ref 1–5.1)
LYMPHOCYTES RELATIVE PERCENT: 16 %
MCH RBC QN AUTO: 30.6 PG (ref 26–34)
MCHC RBC AUTO-ENTMCNC: 32.5 G/DL (ref 31–36)
MCV RBC AUTO: 94.2 FL (ref 80–100)
MONOCYTES ABSOLUTE: 0.8 K/UL (ref 0–1.3)
MONOCYTES RELATIVE PERCENT: 10.3 %
NEUTROPHILS ABSOLUTE: 5.2 K/UL (ref 1.7–7.7)
NEUTROPHILS RELATIVE PERCENT: 70.9 %
PDW BLD-RTO: 13.9 % (ref 12.4–15.4)
PLATELET # BLD: 204 K/UL (ref 135–450)
PMV BLD AUTO: 8.1 FL (ref 5–10.5)
POTASSIUM SERPL-SCNC: 4.2 MMOL/L (ref 3.5–5.1)
RBC # BLD: 3.99 M/UL (ref 4.2–5.9)
SODIUM BLD-SCNC: 139 MMOL/L (ref 136–145)
WBC # BLD: 7.4 K/UL (ref 4–11)

## 2021-01-05 PROCEDURE — 3700000001 HC ADD 15 MINUTES (ANESTHESIA): Performed by: INTERNAL MEDICINE

## 2021-01-05 PROCEDURE — 6360000002 HC RX W HCPCS: Performed by: FAMILY MEDICINE

## 2021-01-05 PROCEDURE — 1200000000 HC SEMI PRIVATE

## 2021-01-05 PROCEDURE — 2500000003 HC RX 250 WO HCPCS: Performed by: NURSE ANESTHETIST, CERTIFIED REGISTERED

## 2021-01-05 PROCEDURE — 6370000000 HC RX 637 (ALT 250 FOR IP): Performed by: FAMILY MEDICINE

## 2021-01-05 PROCEDURE — C9113 INJ PANTOPRAZOLE SODIUM, VIA: HCPCS | Performed by: FAMILY MEDICINE

## 2021-01-05 PROCEDURE — 2709999900 HC NON-CHARGEABLE SUPPLY: Performed by: INTERNAL MEDICINE

## 2021-01-05 PROCEDURE — 2580000003 HC RX 258: Performed by: INTERNAL MEDICINE

## 2021-01-05 PROCEDURE — 6370000000 HC RX 637 (ALT 250 FOR IP): Performed by: INTERNAL MEDICINE

## 2021-01-05 PROCEDURE — 6360000002 HC RX W HCPCS: Performed by: NURSE ANESTHETIST, CERTIFIED REGISTERED

## 2021-01-05 PROCEDURE — 36415 COLL VENOUS BLD VENIPUNCTURE: CPT

## 2021-01-05 PROCEDURE — 0DJ08ZZ INSPECTION OF UPPER INTESTINAL TRACT, VIA NATURAL OR ARTIFICIAL OPENING ENDOSCOPIC: ICD-10-PCS | Performed by: INTERNAL MEDICINE

## 2021-01-05 PROCEDURE — 80048 BASIC METABOLIC PNL TOTAL CA: CPT

## 2021-01-05 PROCEDURE — 6360000002 HC RX W HCPCS: Performed by: INTERNAL MEDICINE

## 2021-01-05 PROCEDURE — 85025 COMPLETE CBC W/AUTO DIFF WBC: CPT

## 2021-01-05 PROCEDURE — 7100000001 HC PACU RECOVERY - ADDTL 15 MIN: Performed by: INTERNAL MEDICINE

## 2021-01-05 PROCEDURE — 3609017100 HC EGD: Performed by: INTERNAL MEDICINE

## 2021-01-05 PROCEDURE — 3700000000 HC ANESTHESIA ATTENDED CARE: Performed by: INTERNAL MEDICINE

## 2021-01-05 PROCEDURE — 2580000003 HC RX 258: Performed by: FAMILY MEDICINE

## 2021-01-05 PROCEDURE — 7100000000 HC PACU RECOVERY - FIRST 15 MIN: Performed by: INTERNAL MEDICINE

## 2021-01-05 PROCEDURE — 2580000003 HC RX 258: Performed by: ANESTHESIOLOGY

## 2021-01-05 RX ORDER — PROPOFOL 10 MG/ML
INJECTION, EMULSION INTRAVENOUS CONTINUOUS PRN
Status: DISCONTINUED | OUTPATIENT
Start: 2021-01-05 | End: 2021-01-05 | Stop reason: SDUPTHER

## 2021-01-05 RX ORDER — PANTOPRAZOLE SODIUM 40 MG/1
40 TABLET, DELAYED RELEASE ORAL
Status: DISCONTINUED | OUTPATIENT
Start: 2021-01-05 | End: 2021-01-07 | Stop reason: HOSPADM

## 2021-01-05 RX ORDER — LIDOCAINE HYDROCHLORIDE 20 MG/ML
INJECTION, SOLUTION INFILTRATION; PERINEURAL PRN
Status: DISCONTINUED | OUTPATIENT
Start: 2021-01-05 | End: 2021-01-05 | Stop reason: SDUPTHER

## 2021-01-05 RX ORDER — GLYCOPYRROLATE 0.2 MG/ML
INJECTION INTRAMUSCULAR; INTRAVENOUS PRN
Status: DISCONTINUED | OUTPATIENT
Start: 2021-01-05 | End: 2021-01-05 | Stop reason: SDUPTHER

## 2021-01-05 RX ORDER — MIDAZOLAM HYDROCHLORIDE 1 MG/ML
INJECTION INTRAMUSCULAR; INTRAVENOUS PRN
Status: DISCONTINUED | OUTPATIENT
Start: 2021-01-05 | End: 2021-01-05 | Stop reason: SDUPTHER

## 2021-01-05 RX ORDER — SODIUM CHLORIDE 9 MG/ML
INJECTION, SOLUTION INTRAVENOUS CONTINUOUS
Status: DISCONTINUED | OUTPATIENT
Start: 2021-01-05 | End: 2021-01-05

## 2021-01-05 RX ORDER — PROPOFOL 10 MG/ML
INJECTION, EMULSION INTRAVENOUS PRN
Status: DISCONTINUED | OUTPATIENT
Start: 2021-01-05 | End: 2021-01-05 | Stop reason: SDUPTHER

## 2021-01-05 RX ADMIN — SODIUM CHLORIDE: 9 INJECTION, SOLUTION INTRAVENOUS at 07:59

## 2021-01-05 RX ADMIN — ACETAMINOPHEN 650 MG: 325 TABLET ORAL at 14:33

## 2021-01-05 RX ADMIN — MIDAZOLAM 2 MG: 1 INJECTION INTRAMUSCULAR; INTRAVENOUS at 08:08

## 2021-01-05 RX ADMIN — Medication 10 ML: at 09:27

## 2021-01-05 RX ADMIN — LIDOCAINE HYDROCHLORIDE 100 MG: 20 INJECTION, SOLUTION INFILTRATION; PERINEURAL at 08:10

## 2021-01-05 RX ADMIN — PANTOPRAZOLE SODIUM 40 MG: 40 TABLET, DELAYED RELEASE ORAL at 14:33

## 2021-01-05 RX ADMIN — HYDRALAZINE HYDROCHLORIDE 10 MG: 20 INJECTION INTRAMUSCULAR; INTRAVENOUS at 09:14

## 2021-01-05 RX ADMIN — SODIUM CHLORIDE: 9 INJECTION, SOLUTION INTRAVENOUS at 20:12

## 2021-01-05 RX ADMIN — PROPOFOL 10 MG: 10 INJECTION, EMULSION INTRAVENOUS at 08:23

## 2021-01-05 RX ADMIN — ENOXAPARIN SODIUM 40 MG: 40 INJECTION SUBCUTANEOUS at 09:15

## 2021-01-05 RX ADMIN — THERA TABS 1 TABLET: TAB at 09:14

## 2021-01-05 RX ADMIN — Medication 1 G: at 18:27

## 2021-01-05 RX ADMIN — PROPOFOL 140 MCG/KG/MIN: 10 INJECTION, EMULSION INTRAVENOUS at 08:10

## 2021-01-05 RX ADMIN — GLYCOPYRROLATE 0.2 MG: 0.2 INJECTION INTRAMUSCULAR; INTRAVENOUS at 08:09

## 2021-01-05 RX ADMIN — THIAMINE HYDROCHLORIDE 100 MG: 100 INJECTION, SOLUTION INTRAMUSCULAR; INTRAVENOUS at 09:20

## 2021-01-05 RX ADMIN — Medication 10 ML: at 09:16

## 2021-01-05 RX ADMIN — PANTOPRAZOLE SODIUM 40 MG: 40 INJECTION, POWDER, FOR SOLUTION INTRAVENOUS at 09:16

## 2021-01-05 RX ADMIN — PROPOFOL 30 MG: 10 INJECTION, EMULSION INTRAVENOUS at 08:11

## 2021-01-05 RX ADMIN — SODIUM CHLORIDE: 9 INJECTION, SOLUTION INTRAVENOUS at 04:33

## 2021-01-05 ASSESSMENT — PULMONARY FUNCTION TESTS
PIF_VALUE: 2
PIF_VALUE: 2
PIF_VALUE: 1
PIF_VALUE: 3
PIF_VALUE: 2
PIF_VALUE: 3
PIF_VALUE: 1
PIF_VALUE: 1
PIF_VALUE: 2

## 2021-01-05 ASSESSMENT — PAIN SCALES - GENERAL
PAINLEVEL_OUTOF10: 4
PAINLEVEL_OUTOF10: 0

## 2021-01-05 ASSESSMENT — ENCOUNTER SYMPTOMS: SHORTNESS OF BREATH: 0

## 2021-01-05 NOTE — ANESTHESIA PRE PROCEDURE
Department of Anesthesiology  Preprocedure Note       Name:  Marvin Jimenez   Age:  41 y.o.  :  1975                                          MRN:  [id number]         Date:  2021      Surgeon: Michelle Diaz):  Rufino Holman MD    Procedure: Procedure(s):  EGD DIAGNOSTIC ONLY    Medications prior to admission:   Prior to Admission medications    Medication Sig Start Date End Date Taking?  Authorizing Provider   cephALEXin (KEFLEX) 500 MG capsule Take 500 mg by mouth 3 times daily   Yes Historical Provider, MD   multivitamin-iron-minerals-folic acid (CENTRUM) chewable tablet Take 1 tablet by mouth daily   Yes Historical Provider, MD   ondansetron (ZOFRAN) 4 MG tablet Take 1 tablet by mouth every 8 hours as needed for Nausea or Vomiting 3/4/18   Jessica Fong MD       Current medications:    Current Facility-Administered Medications   Medication Dose Route Frequency Provider Last Rate Last Admin    0.9 % sodium chloride infusion   Intravenous Continuous Bearl MD Liliana        hydrALAZINE (APRESOLINE) injection 10 mg  10 mg Intravenous Q4H PRN Himanshu Perez MD   10 mg at 21 2150    sodium chloride flush 0.9 % injection 10 mL  10 mL Intravenous 2 times per day Himanshu Perez MD        sodium chloride flush 0.9 % injection 10 mL  10 mL Intravenous PRN Himanshu Perez MD        thiamine (B-1) injection 100 mg  100 mg Intravenous Daily Himanshu Perez MD   100 mg at 21 1619    LORazepam (ATIVAN) tablet 1 mg  1 mg Oral Q1H PRN Himanshu Perez MD        Or    LORazepam (ATIVAN) injection 1 mg  1 mg Intravenous Q1H PRN Himanshu Perez MD        Or    LORazepam (ATIVAN) tablet 2 mg  2 mg Oral Q1H PRN Himanshu Perez MD        Or    LORazepam (ATIVAN) injection 2 mg  2 mg Intravenous Q1H PRN Himanshu Perez MD        Or    LORazepam (ATIVAN) tablet 3 mg  3 mg Oral Q1H PRN Himanshu Perez MD        Or    LORazepam (ATIVAN) injection 3 mg  3 mg Intravenous Q1H PRN Himanshu Perez MD        Or  LORazepam (ATIVAN) tablet 4 mg  4 mg Oral Q1H PRN Selene Ware MD        Or    LORazepam (ATIVAN) injection 4 mg  4 mg Intravenous Q1H PRN Selene Ware MD        sodium chloride flush 0.9 % injection 10 mL  10 mL Intravenous 2 times per day Selene Ware MD        sodium chloride flush 0.9 % injection 10 mL  10 mL Intravenous PRN Selene Ware MD        haloperidol lactate (HALDOL) injection 5 mg  5 mg Intravenous Q4H PRN Selene Ware MD        0.9 % sodium chloride infusion   Intravenous Continuous Selene Ware  mL/hr at 01/05/21 0433 New Bag at 01/05/21 0433    pantoprazole (PROTONIX) injection 40 mg  40 mg Intravenous Daily Natalie Francisco MD   40 mg at 01/04/21 0802    multivitamin 1 tablet  1 tablet Oral Daily Natalie Francisco MD   1 tablet at 01/03/21 2139    cefTRIAXone (ROCEPHIN) 1 g in sterile water 10 mL IV syringe  1 g Intravenous Q24H Natalie Francisco MD   1 g at 01/04/21 1715    sodium chloride flush 0.9 % injection 10 mL  10 mL Intravenous 2 times per day Natalie Francisco MD        sodium chloride flush 0.9 % injection 10 mL  10 mL Intravenous PRN Natalie Francisco MD        enoxaparin (LOVENOX) injection 40 mg  40 mg Subcutaneous Daily Natalie Francisco MD   40 mg at 01/03/21 2142    promethazine (PHENERGAN) tablet 12.5 mg  12.5 mg Oral Q6H PRN Natalie Francisco MD        Or    ondansetron (ZOFRAN) injection 4 mg  4 mg Intravenous Q6H PRN Natalie Francisco MD        polyethylene glycol (GLYCOLAX) packet 17 g  17 g Oral Daily PRN Natalie Francisco MD        acetaminophen (TYLENOL) tablet 650 mg  650 mg Oral Q6H PRN Natalie Francisco MD        Or   Erendira Tinoco acetaminophen (TYLENOL) suppository 650 mg  650 mg Rectal Q6H PRN Natalie Francisco MD           Allergies:  No Known Allergies    Problem List:    Patient Active Problem List   Diagnosis Code    Shoulder dislocation S43.006A    [facility] N17.9    Melena K92.1    Esophageal thickening K22.8    Alcohol abuse, continuous F10.10    Fever R50.9  Tobacco abuse Z72.0       Past Medical History:        Diagnosis Date    Hypertension        Past Surgical History:  History reviewed. No pertinent surgical history. Social History:    Social History     Tobacco Use    Smoking status: Current Every Day Smoker     Packs/day: 1.00     Years: 25.00     Pack years: 25.00     Types: Cigarettes    Smokeless tobacco: Never Used   Substance Use Topics    Alcohol use: Yes     Alcohol/week: 12.0 standard drinks     Types: 12 Shots of liquor per week     Comment: every day Vodka tonics                                Ready to quit: Not Answered  Counseling given: Not Answered      Vital Signs (Current):   Vitals:    01/04/21 2145 01/04/21 2357 01/05/21 0430 01/05/21 0727   BP: (!) 157/103 (!) 144/89 (!) 153/83 (!) 154/102   Pulse: 81 86 83 76   Resp: 14 14 12 16   Temp: 98.2 °F (36.8 °C) 98.6 °F (37 °C) 98.6 °F (37 °C) 97.8 °F (36.6 °C)   TempSrc: Oral Oral Oral Temporal   SpO2: 98% 96% 98% 100%   Weight:       Height:                                                  BP Readings from Last 3 Encounters:   01/05/21 (!) 154/102   03/04/18 (!) 162/96   05/11/16 (!) 173/104       NPO Status: Time of last liquid consumption: 0059                        Time of last solid consumption: 1700                        Date of last liquid consumption: 01/04/21                        Date of last solid food consumption: 01/02/21    BMI:   Wt Readings from Last 3 Encounters:   01/03/21 170 lb (77.1 kg)   03/04/18 166 lb 10.7 oz (75.6 kg)   05/10/16 170 lb (77.1 kg)     Body mass index is 25.1 kg/m².     CBC:   Lab Results   Component Value Date    WBC 7.4 01/05/2021    RBC 3.99 01/05/2021    HGB 12.2 01/05/2021    HCT 37.6 01/05/2021    MCV 94.2 01/05/2021    RDW 13.9 01/05/2021     01/05/2021       CMP:   Lab Results   Component Value Date     01/05/2021    K 4.2 01/05/2021    K 3.8 01/03/2021     01/05/2021    CO2 28 01/05/2021    BUN 24 01/05/2021 CREATININE 1.8 01/05/2021    GFRAA 50 01/05/2021    AGRATIO 0.9 01/03/2021    LABGLOM 41 01/05/2021    GLUCOSE 94 01/05/2021    PROT 7.1 01/03/2021    CALCIUM 8.7 01/05/2021    BILITOT 0.3 01/03/2021    ALKPHOS 65 01/03/2021    AST 14 01/03/2021    ALT 13 01/03/2021       POC Tests: No results for input(s): POCGLU, POCNA, POCK, POCCL, POCBUN, POCHEMO, POCHCT in the last 72 hours. Coags:   Lab Results   Component Value Date    PROTIME 14.0 01/03/2021    INR 1.20 01/03/2021       HCG (If Applicable): No results found for: PREGTESTUR, PREGSERUM, HCG, HCGQUANT     ABGs: No results found for: PHART, PO2ART, LJZ0ZEH, QNW3PGT, BEART, A4KDXWDL     Type & Screen (If Applicable):  No results found for: LABABO, LABRH    Drug/Infectious Status (If Applicable):  No results found for: HIV, HEPCAB    COVID-19 Screening (If Applicable):   Lab Results   Component Value Date    COVID19 Not Detected 01/04/2021         Anesthesia Evaluation  Patient summary reviewed and Nursing notes reviewed no history of anesthetic complications:   Airway: Mallampati: I  TM distance: >3 FB   Neck ROM: full  Mouth opening: > = 3 FB Dental: normal exam         Pulmonary:       (-) asthma and shortness of breath                           Cardiovascular:    (+) hypertension:,     (-)  angina                Neuro/Psych:      (-) CVA           GI/Hepatic/Renal:        (-) GERD and liver disease      ROS comment: EtOH and cocaine abuse, UTI, on CIWA. Endo/Other:        (-) diabetes mellitus, hypothyroidism               Abdominal:           Vascular:     - PVD. Anesthesia Plan      MAC     ASA 3       Induction: intravenous. Anesthetic plan and risks discussed with patient. Plan discussed with CRNA.                   Donna Sims MD   1/5/2021

## 2021-01-05 NOTE — PROGRESS NOTES
Physician Progress Note      Cathy Mendoza  CSN #:                  [id number]  :                       1975  ADMIT DATE:       1/3/2021 1:54 PM  [address] DATE:  RESPONDING  PROVIDER #:        Celsa Garcia MD          QUERY TEXT:    Pt admitted with Gastrointestinal hemorrhage. Pt noted to have UTI. If   possible, please document in the progress notes and discharge summary if you   are evaluating and /or treating any of the following: The medical record reflects the following:  Risk Factors: UTI  Clinical Indicators: elevated HR and WBC  Treatment: IV antibiotics (Rocephin)  Options provided:  -- Sepsis, present on admission  -- Sepsis, present on admission, now resolved  -- Sepsis, not present on admission  -- No Sepsis, UTI only  -- Sepsis was ruled out  -- Other - I will add my own diagnosis  -- Disagree - Not applicable / Not valid  -- Disagree - Clinically unable to determine / Unknown  -- Refer to Clinical Documentation Reviewer    PROVIDER RESPONSE TEXT:    This patient has sepsis which was present on admission.     Query created by: Reji De La Rosa on 2021 11:10 AM      Electronically signed by:  Celsa Garcia MD 2021 10:02 PM

## 2021-01-05 NOTE — BRIEF OP NOTE
Brief Postoperative Note      Patient: Jesusita Bernardo  YOB: 1975  MRN: [id number]    Date of Procedure: 1/5/2021    Pre-Op Diagnosis: Black Stool         Procedure(s):  EGD DIAGNOSTIC ONLY    Surgeon(s):  Thiago Alonzo MD    Anesthesia: Monitor Anesthesia Care    Estimated Blood Loss (mL): Minimal    Complications: Hypoxemia    Findings:   Mild distal esophagitis, 3-4 cm HH, mild gastritis     Plans:  Oral PPI BID for 8 weeks and then daily thereafter. Absolutely no NSAIDs. GI will sign off. Please call if you have questions.     Electronically signed by Thiago Alonzo MD on 1/5/2021 at 8:32 AM

## 2021-01-05 NOTE — PROGRESS NOTES
1/4/21 2145: Shift assessment complete. Vital signs stable. BP elevated 157/103. Pt denies n/v, headaches, or changes in vision. PRN hydralazine given for elevated BP. Scheduled night medications administered (See MAR). Instructed to void in collection containers in order to monitor I&O. Independent in room. Will be NPO at midnight. The care plan and education has been reviewed and mutually agreed upon with the patient. All needs met. Will monitor. 1/4/21 5031: BP improved 144/89 following dose of hydralazine. Will monitor.

## 2021-01-05 NOTE — PROGRESS NOTES
[facility]    1/5/2021 6:22 AM        Name: Je Alvares . Admitted: 1/3/2021  Primary Care Provider: No primary care provider on file. (Tel: None)    Brief Course:  42 yo M with history of alcohol abuse, cocaine abuse came to ER with complaints of fever, melena and dark urine. Patient had been diagnosed with UTI at urgent care, started on Abx as OP and was using Ibuprofen. Admitted as inpatient for melena, UTI with sepsis, RACIEL and esophageal thickening on CT Ab/P.  COVID negative on 1/4. Followed by GI and Urology. Started on IVF and IV Abx. S/P EGD on 1/5/21. Findings:   Mild distal esophagitis, 3-4 cm HH, mild gastritis      Plans:  Oral PPI BID for 8 weeks and then daily thereafter. Absolutely no NSAIDs. GI will sign off. Please call if you have questions. CC:  Melena, dark urine    Subjective:  . Patient denies melena. Wants to eat. No CP, SOB, HA or abdominal pain.       Reviewed interval ancillary notes    Current Medications      hydrALAZINE (APRESOLINE) injection 10 mg, Q4H PRN      sodium chloride flush 0.9 % injection 10 mL, 2 times per day      sodium chloride flush 0.9 % injection 10 mL, PRN      thiamine (B-1) injection 100 mg, Daily      LORazepam (ATIVAN) tablet 1 mg, Q1H PRN    Or      LORazepam (ATIVAN) injection 1 mg, Q1H PRN    Or      LORazepam (ATIVAN) tablet 2 mg, Q1H PRN    Or      LORazepam (ATIVAN) injection 2 mg, Q1H PRN    Or      LORazepam (ATIVAN) tablet 3 mg, Q1H PRN    Or      LORazepam (ATIVAN) injection 3 mg, Q1H PRN    Or      LORazepam (ATIVAN) tablet 4 mg, Q1H PRN    Or      LORazepam (ATIVAN) injection 4 mg, Q1H PRN      sodium chloride flush 0.9 % injection 10 mL, 2 times per day      sodium chloride flush 0.9 % injection 10 mL, PRN      haloperidol lactate (HALDOL) injection 5 mg, Q4H PRN      0.9 % sodium chloride infusion, Continuous      pantoprazole (PROTONIX) injection 40 mg, Daily      multivitamin 1 tablet, Daily      cefTRIAXone (ROCEPHIN) 1 g in sterile water 10 mL IV syringe, Q24H      sodium chloride flush 0.9 % injection 10 mL, 2 times per day      sodium chloride flush 0.9 % injection 10 mL, PRN      enoxaparin (LOVENOX) injection 40 mg, Daily      promethazine (PHENERGAN) tablet 12.5 mg, Q6H PRN    Or      ondansetron (ZOFRAN) injection 4 mg, Q6H PRN      polyethylene glycol (GLYCOLAX) packet 17 g, Daily PRN      acetaminophen (TYLENOL) tablet 650 mg, Q6H PRN    Or      acetaminophen (TYLENOL) suppository 650 mg, Q6H PRN        Objective:  BP (!) 153/83   Pulse 83   Temp 98.6 °F (37 °C) (Oral)   Resp 12   Ht 5' 9\" (1.753 m)   Wt 170 lb (77.1 kg)   SpO2 98%   BMI 25.10 kg/m²     Intake/Output Summary (Last 24 hours) at 1/5/2021 0622  Last data filed at 1/5/2021 0434  Gross per 24 hour   Intake 4079 ml   Output 350 ml   Net 3729 ml      Wt Readings from Last 3 Encounters:   01/03/21 170 lb (77.1 kg)   03/04/18 166 lb 10.7 oz (75.6 kg)   05/10/16 170 lb (77.1 kg)       General appearance:  Appears comfortable  Eyes: Sclera clear. Pupils equal.  ENT: Moist oral mucosa. Trachea midline, no adenopathy. Cardiovascular: Regular rhythm, normal S1, S2. No murmur. No edema in lower extremities  Respiratory: Not using accessory muscles. Good inspiratory effort. Clear to auscultation bilaterally, no wheeze or crackles. GI: Abdomen soft, no tenderness, not distended, normal bowel sounds  Musculoskeletal: No cyanosis in digits, neck supple  Neurology: Grossly intact. No speech or motor deficits  Psych: Normal affect. Alert and oriented in time, place and person  Skin: Warm, dry, normal turgor  Extremity exam shows brisk capillary refill.   Peripheral pulses are palpable in lower extremities     Labs and Tests:  CBC:   Recent Labs     01/03/21  1604 01/04/21  1040   WBC 15.0* 10.6   HGB 15.1 13.1*    183 BMP:    Recent Labs     01/03/21  1604 01/04/21  1040   * 137   K 3.8 4.1   CL 96* 103   CO2 25 23   BUN 34* 32*   CREATININE 2.4* 2.1*   GLUCOSE 99 93     Hepatic:   Recent Labs     01/03/21  1604   AST 14*   ALT 13   BILITOT 0.3   ALKPHOS 65     CT ABDOMEN PELVIS WO CONTRAST Additional Contrast? None   Final Result   No acute intra-abdominal or pelvic abnormality. Persistent circumferential thickening of the distal esophagus may be   secondary to a soft a giant is. A neoplastic abnormality cannot be excluded   and direct visualization or esophagram is recommended for further evaluation. Colonic diverticulosis. No evidence of diverticulitis         XR CHEST PORTABLE   Final Result   No acute cardiopulmonary disease. Problem List  Principal Problem:    RACIEL (acute kidney injury) ([address]  Active Problems:    Melena    Esophageal thickening    Alcohol abuse, continuous    Fever    Tobacco abuse  Resolved Problems:    * No resolved hospital problems. *       Assessment & Plan:   1. Cont IVF  2. Cont Rocephin IV  3. General diet per GI  4. Protonix IV to PO bid  5. Urology consult for dark urine/?hematuria pending  6. CIWA Ativan PRN  7. Hydralazine IV PRN  8. Haldol IM PRN agitation    IV Access: Peripheral  Alarcon: No  Diet: Diet NPO, After Midnight  Code:Full Code  DVT PPX Lovenox  Disposition Home    Discussed with patient, nursing and CM. Continue IVF for RACIEL. Home once RACIEL resolved.       Alisha Villafuerte MD   1/5/2021 6:22 AM

## 2021-01-05 NOTE — CONSULTS
Urology Consult Note  [facility]     Patient: Sedrick Mora MRN: [id number]  Room/Bed: [facility]6757/2741-69   YOB: 1975  Age/Sex: 41 y.o.male  Admission Date: 1/3/2021     Date of Service:  1/5/2021    Consulting Physician: Gaby Blue CNP  Admitting/Requesting Physician: Krystyna Kidd MD  Primary Care Physician: No primary care provider on file. Reason for Consult: Gross Hematuria    ASSESSMENT/PLAN     Sedrick Mora is a 41 y.o. M with current daily alcohol and nicotine use. He presented to hospital with fever since Monday, gross hematuria, and dark tary stool. Was started on keflex for possible uti. Found to have esophageal thickening per CT A/P and Creatinine of 2.4. He has a 31 pack year smoking history, has smoked 1ppd since 14. No known family history of bladder Cancer. His gross hematuria has since resolved. He denies flank pain, nausea, dysuria, frequency, or suprapubic pain. Recommendations:  -Urine Culture negative for infection. -CT A/P showing grossly negative bladder   -Needs cystoscopy on outpatient basis, Follow up has been arranged  -OK to d/c per urology when medically cleared. All patient questions were answered. He understands the plan as listed above. HISTORY     Chief Complaint:   Chief Complaint   Patient presents with    Fever     pt states he has been having fever since Monday. Today pt noticed his urine is dark and his stools are tarry. denies pain. pt states he has been taking motrin and aleve       History of Present Illness: Sedrick Mora is a 41 y.o. male with Gross Hematuria. Onset of symptoms was three days ago with improving course since that time. Symptoms are aggravated by smoking. Symptoms improved with not smoking. Associated symptoms include fever. Patient also reports tary stool. He has tried the following treatments: ibuprofen. Past Medical History:  He has a past medical history of Hypertension.      Hospital Problem List:  Principal Problem:    RACIEL (acute kidney injury) ([facility] [address]  Active Problems:    Melena    Esophageal thickening    Alcohol abuse, continuous    Fever    Tobacco abuse  Resolved Problems:    * No resolved hospital problems. *      Past Surgical History:  He has no past surgical history on file. Social History:  He reports that he has been smoking cigarettes. He has a 25.00 pack-year smoking history. He has never used smokeless tobacco. He reports current alcohol use of about 12.0 standard drinks of alcohol per week. He reports that he does not use drugs. Family History:  family history is not on file. Allergies:  No Known Allergies    Medications:  Scheduled Meds:   pantoprazole  40 mg Oral BID AC    sodium chloride flush  10 mL Intravenous 2 times per day    thiamine  100 mg Intravenous Daily    sodium chloride flush  10 mL Intravenous 2 times per day    multivitamin  1 tablet Oral Daily    cefTRIAXone (ROCEPHIN) IV  1 g Intravenous Q24H    sodium chloride flush  10 mL Intravenous 2 times per day    enoxaparin  40 mg Subcutaneous Daily     Continuous Infusions:   sodium chloride 150 mL/hr at 01/05/21 0433     PRN Meds:hydrALAZINE, sodium chloride flush, LORazepam **OR** LORazepam **OR** LORazepam **OR** LORazepam **OR** LORazepam **OR** LORazepam **OR** LORazepam **OR** LORazepam, sodium chloride flush, haloperidol lactate, sodium chloride flush, promethazine **OR** ondansetron, polyethylene glycol, acetaminophen **OR** acetaminophen    Review of Systems:  Pertinent positives/negatives reviewed in HPI. All other systems reviewed and negative, unless noted below.     Constitutional: Negative  Genitourinary: see HPI  HEENT: Negative   Cardiovascular: Negative   Respiratory: Negative   Gastrointestinal: Negative   Musculoskeletal: Negative   Neurological: Negative   Psychiatric: Negative   Integumentary: Negative     PHYSICAL EXAM     Vitals:    01/05/21 0900   BP: (!) 161/97   Pulse: 101   Resp: 18   Temp: 98.3 °F (36.8 °C)   SpO2: 95%     CONSTITUTIONAL: The patient is well nourished/developed, with no distress noted. NEUROLOGICAL/PSYCHIATRIC: Oriented to place and time, normal affected noted. NECK: The neck is symmetrical and supple, with no masses noted. CARDIOVASCULAR: Regular rate and rhythm, no evidence of swelling noted. RESPIRATORY: Normal respiratory effort with no wheezing noted. ABDOMEN: Abdomen soft, non-tender, non-distended. No enlarged liver or spleen. No hernias noted. SKIN: Skin appears normal.  LYMPHATICS: No adenopathy noted. CVA: No CVA tenderness bilaterally. GENITOURINARY: The penis is without rash or lesions and meatus with expected size and location. The scrotum appears normal. Bilateral testicles appears to be of normal size and location. No masses or tenderness noted of testicles or epididymis. TERI: Deferred.      Ins/Outs:    Intake/Output Summary (Last 24 hours) at 1/5/2021 0953  Last data filed at 1/5/2021 0847  Gross per 24 hour   Intake 4729 ml   Output 350 ml   Net 4379 ml       LABS     CBC   Lab Results   Component Value Date    WBC 7.4 01/05/2021    RBC 3.99 01/05/2021    HGB 12.2 01/05/2021    HCT 37.6 01/05/2021    MCV 94.2 01/05/2021    MCH 30.6 01/05/2021    MCHC 32.5 01/05/2021    RDW 13.9 01/05/2021     01/05/2021    MPV 8.1 01/05/2021     BMP   Lab Results   Component Value Date     01/05/2021    K 4.2 01/05/2021    K 3.8 01/03/2021     01/05/2021    CO2 28 01/05/2021    BUN 24 01/05/2021    CREATININE 1.8 01/05/2021    GLUCOSE 94 01/05/2021    CALCIUM 8.7 01/05/2021     Urinalysis:   Lab Results   Component Value Date    Molina Bump 01/03/2021    GLUCOSEU Negative 01/03/2021    BLOODU LARGE 01/03/2021    NITRU Negative 01/03/2021    LEUKOCYTESUR Negative 01/03/2021     Urine culture:   Recent Labs     01/03/21  1258   LABURIN No growth at 18 to 36 hours     PSA: No results found for: PSA      IMAGING     Ct Abdomen Pelvis Wo Contrast Additional Contrast? None    Result Date: 1/3/2021  EXAMINATION: CT OF THE ABDOMEN AND PELVIS WITHOUT CONTRAST 1/3/2021 5:28 pm TECHNIQUE: CT of the abdomen and pelvis was performed without the administration of intravenous contrast. Multiplanar reformatted images are provided for review. Dose modulation, iterative reconstruction, and/or weight based adjustment of the mA/kV was utilized to reduce the radiation dose to as low as reasonably achievable. COMPARISON: 03/04/2018 HISTORY: ORDERING SYSTEM PROVIDED HISTORY: hematuria, GI bleeding, leukocytosis TECHNOLOGIST PROVIDED HISTORY: Reason for exam:->hematuria, GI bleeding, leukocytosis Additional Contrast?->None Reason for Exam: Fever (pt states he has been having fever since Monday. Today pt noticed his urine is dark and his stools are tarry. denies pain. pt states he has been taking motrin and aleve FINDINGS: Lower Chest: The lung bases are clear Organs: The solid organs of the abdomen are unremarkable for a noncontrast exam.  The gallbladder appears within normal limits. GI/Bowel: Again, there is mild circumferential thickening of the distal esophagus. There are scattered diverticula projecting off of the sigmoid colon. There is no evidence of bowel wall thickening, inflammation or free fluid. There is no bowel obstruction. Pelvis: There is no free fluid Peritoneum/Retroperitoneum: The abdominal aorta and iliac arteries are normal in caliber. There is no pathologic adenopathy. Bones/Soft Tissues: There is a small left inguinal hernia containing fat similar to the prior exam.  There is a periumbilical hernia containing fat which is also stable. No acute intra-abdominal or pelvic abnormality. Persistent circumferential thickening of the distal esophagus may be secondary to a soft a giant is. A neoplastic abnormality cannot be excluded and direct visualization or esophagram is recommended for further evaluation. Colonic diverticulosis.   No evidence of diverticulitis     Xr Chest Portable    Result Date: 1/3/2021  EXAMINATION: ONE XRAY VIEW OF THE CHEST 1/3/2021 2:31 pm COMPARISON: None. HISTORY: ORDERING SYSTEM PROVIDED HISTORY: fever TECHNOLOGIST PROVIDED HISTORY: Reason for exam:->fever Reason for Exam: Fever (pt states he has been having fever since Monday. Today pt noticed his urine is dark and his stools are tarry. denies pain. pt states he has been taking motrin and aleve) Acuity: Acute Type of Exam: Initial FINDINGS: The cardiac silhouette, mediastinal and hilar contours are normal.  The lungs are clear. There are no pleural effusions. No acute osseous abnormalities are identified. No acute cardiopulmonary disease.             Electronically signed by: Sean Nevarez CNP 1/5/2021   [facility]  Office Contact: [phone]

## 2021-01-05 NOTE — PROGRESS NOTES
All drinks removed from bedside table. Patient instructed on remaining NPO overnight and he verbalizes understanding to teaching.

## 2021-01-05 NOTE — PLAN OF CARE
Problem: Bleeding:  Goal: Will show no signs and symptoms of excessive bleeding  Description: Will show no signs and symptoms of excessive bleeding  Outcome: Ongoing     Problem: Daily Care:  Goal: Daily care needs are met  Description: Daily care needs are met  Outcome: Met This Shift     Problem: Discharge Planning:  Goal: Patients continuum of care needs are met  Description: Patients continuum of care needs are met  Outcome: Ongoing

## 2021-01-06 LAB
ANION GAP SERPL CALCULATED.3IONS-SCNC: 8 MMOL/L (ref 3–16)
BASOPHILS ABSOLUTE: 0.1 K/UL (ref 0–0.2)
BASOPHILS RELATIVE PERCENT: 0.7 %
BUN BLDV-MCNC: 16 MG/DL (ref 7–20)
CALCIUM SERPL-MCNC: 8.7 MG/DL (ref 8.3–10.6)
CHLORIDE BLD-SCNC: 109 MMOL/L (ref 99–110)
CO2: 25 MMOL/L (ref 21–32)
CREAT SERPL-MCNC: 1.6 MG/DL (ref 0.9–1.3)
EOSINOPHILS ABSOLUTE: 0.2 K/UL (ref 0–0.6)
EOSINOPHILS RELATIVE PERCENT: 2.4 %
GFR AFRICAN AMERICAN: 57
GFR NON-AFRICAN AMERICAN: 47
GLUCOSE BLD-MCNC: 106 MG/DL (ref 70–99)
HCT VFR BLD CALC: 40.4 % (ref 40.5–52.5)
HEMOGLOBIN: 13.1 G/DL (ref 13.5–17.5)
LYMPHOCYTES ABSOLUTE: 1.3 K/UL (ref 1–5.1)
LYMPHOCYTES RELATIVE PERCENT: 15.9 %
MCH RBC QN AUTO: 31 PG (ref 26–34)
MCHC RBC AUTO-ENTMCNC: 32.5 G/DL (ref 31–36)
MCV RBC AUTO: 95.6 FL (ref 80–100)
MONOCYTES ABSOLUTE: 0.6 K/UL (ref 0–1.3)
MONOCYTES RELATIVE PERCENT: 8.1 %
NEUTROPHILS ABSOLUTE: 5.8 K/UL (ref 1.7–7.7)
NEUTROPHILS RELATIVE PERCENT: 72.9 %
PDW BLD-RTO: 13.8 % (ref 12.4–15.4)
PLATELET # BLD: 236 K/UL (ref 135–450)
PMV BLD AUTO: 7.7 FL (ref 5–10.5)
POTASSIUM SERPL-SCNC: 4.1 MMOL/L (ref 3.5–5.1)
RBC # BLD: 4.23 M/UL (ref 4.2–5.9)
SODIUM BLD-SCNC: 142 MMOL/L (ref 136–145)
WBC # BLD: 8 K/UL (ref 4–11)

## 2021-01-06 PROCEDURE — 6360000002 HC RX W HCPCS: Performed by: INTERNAL MEDICINE

## 2021-01-06 PROCEDURE — 85025 COMPLETE CBC W/AUTO DIFF WBC: CPT

## 2021-01-06 PROCEDURE — 2580000003 HC RX 258: Performed by: INTERNAL MEDICINE

## 2021-01-06 PROCEDURE — 2580000003 HC RX 258: Performed by: FAMILY MEDICINE

## 2021-01-06 PROCEDURE — 2580000003 HC RX 258: Performed by: NURSE PRACTITIONER

## 2021-01-06 PROCEDURE — 6360000002 HC RX W HCPCS: Performed by: FAMILY MEDICINE

## 2021-01-06 PROCEDURE — 6360000002 HC RX W HCPCS: Performed by: NURSE PRACTITIONER

## 2021-01-06 PROCEDURE — 80048 BASIC METABOLIC PNL TOTAL CA: CPT

## 2021-01-06 PROCEDURE — 6370000000 HC RX 637 (ALT 250 FOR IP): Performed by: FAMILY MEDICINE

## 2021-01-06 PROCEDURE — 6370000000 HC RX 637 (ALT 250 FOR IP): Performed by: INTERNAL MEDICINE

## 2021-01-06 PROCEDURE — 1200000000 HC SEMI PRIVATE

## 2021-01-06 PROCEDURE — 36415 COLL VENOUS BLD VENIPUNCTURE: CPT

## 2021-01-06 RX ORDER — HYDRALAZINE HYDROCHLORIDE 20 MG/ML
10 INJECTION INTRAMUSCULAR; INTRAVENOUS ONCE
Status: COMPLETED | OUTPATIENT
Start: 2021-01-06 | End: 2021-01-06

## 2021-01-06 RX ORDER — HYDRALAZINE HYDROCHLORIDE 25 MG/1
50 TABLET, FILM COATED ORAL EVERY 8 HOURS SCHEDULED
Status: DISCONTINUED | OUTPATIENT
Start: 2021-01-06 | End: 2021-01-07

## 2021-01-06 RX ORDER — AMLODIPINE BESYLATE 5 MG/1
5 TABLET ORAL DAILY
Status: DISCONTINUED | OUTPATIENT
Start: 2021-01-06 | End: 2021-01-07

## 2021-01-06 RX ADMIN — SODIUM CHLORIDE: 9 INJECTION, SOLUTION INTRAVENOUS at 02:51

## 2021-01-06 RX ADMIN — Medication 10 ML: at 09:45

## 2021-01-06 RX ADMIN — SODIUM CHLORIDE: 9 INJECTION, SOLUTION INTRAVENOUS at 23:57

## 2021-01-06 RX ADMIN — THERA TABS 1 TABLET: TAB at 09:44

## 2021-01-06 RX ADMIN — PANTOPRAZOLE SODIUM 40 MG: 40 TABLET, DELAYED RELEASE ORAL at 06:15

## 2021-01-06 RX ADMIN — THIAMINE HYDROCHLORIDE 100 MG: 100 INJECTION, SOLUTION INTRAMUSCULAR; INTRAVENOUS at 11:31

## 2021-01-06 RX ADMIN — HYDRALAZINE HYDROCHLORIDE 10 MG: 20 INJECTION INTRAMUSCULAR; INTRAVENOUS at 20:23

## 2021-01-06 RX ADMIN — HYDRALAZINE HYDROCHLORIDE 10 MG: 20 INJECTION INTRAMUSCULAR; INTRAVENOUS at 00:17

## 2021-01-06 RX ADMIN — Medication 10 ML: at 20:24

## 2021-01-06 RX ADMIN — HYDRALAZINE HYDROCHLORIDE 10 MG: 20 INJECTION INTRAMUSCULAR; INTRAVENOUS at 17:50

## 2021-01-06 RX ADMIN — ENOXAPARIN SODIUM 40 MG: 40 INJECTION SUBCUTANEOUS at 09:00

## 2021-01-06 RX ADMIN — SODIUM CHLORIDE: 9 INJECTION, SOLUTION INTRAVENOUS at 09:46

## 2021-01-06 RX ADMIN — SODIUM CHLORIDE: 9 INJECTION, SOLUTION INTRAVENOUS at 17:33

## 2021-01-06 RX ADMIN — HYDRALAZINE HYDROCHLORIDE 50 MG: 25 TABLET, FILM COATED ORAL at 21:15

## 2021-01-06 RX ADMIN — HYDRALAZINE HYDROCHLORIDE 50 MG: 25 TABLET, FILM COATED ORAL at 14:00

## 2021-01-06 RX ADMIN — PANTOPRAZOLE SODIUM 40 MG: 40 TABLET, DELAYED RELEASE ORAL at 15:05

## 2021-01-06 RX ADMIN — AMLODIPINE BESYLATE 5 MG: 5 TABLET ORAL at 11:30

## 2021-01-06 ASSESSMENT — PAIN SCALES - GENERAL
PAINLEVEL_OUTOF10: 0

## 2021-01-06 NOTE — PROGRESS NOTES
D: Regarding the patient's labs for today, I called \"Yolanda\" and they were drawn. She explained that the lab is having issues with a piece of equipment, so they are in downtime. She said they would tube the results of the CMP to me; the CBC is resulted. A: Updated Dr. Shama Danielson on the status of the missing lab results through Kiki Munoz. R: Will inform patient of the delay in results.

## 2021-01-06 NOTE — PROGRESS NOTES
Shift assessment completed, pt is alert nd oriented, VSS, independent in room, call within reach, denies any pain or other needs at this time. See flowsheets and MAR. Will monitor pt. The care plan and education has been reviewed and mutually agreed upon with the patient. 0030: PRN Hydralazine given. 0210: Pt ambulates multiple laps in hallway, back in room, will monitor pt.

## 2021-01-06 NOTE — PROGRESS NOTES
[facility] PROGRESS NOTE    1/6/2021 3:22 PM        Name: Lopez Gambino . Admitted: 1/3/2021  Primary Care Provider: No primary care provider on file. (Tel: None)    Brief Course:  42 yo M with history of alcohol abuse, cocaine abuse came to ER with complaints of fever, melena and dark urine. Patient had been diagnosed with UTI at urgent care, started on Abx as OP and was using Ibuprofen. Admitted as inpatient for melena, UTI with sepsis, RACIEL and esophageal thickening on CT Ab/P.  COVID negative on 1/4. Followed by GI and Urology. Started on IVF and IV Abx. S/P EGD on 1/5/21. Findings:   Mild distal esophagitis, 3-4 cm HH, mild gastritis      Plans:  Oral PPI BID for 8 weeks and then daily thereafter. Absolutely no NSAIDs. GI will sign off. Please call if you have questions. CC:  Melena, dark urine    Subjective:  . Patient denies melena. No CP, SOB, HA or abdominal pain.       Reviewed interval ancillary notes    Current Medications      amLODIPine (NORVASC) tablet 5 mg, Daily      hydrALAZINE (APRESOLINE) tablet 50 mg, 3 times per day      pantoprazole (PROTONIX) tablet 40 mg, BID AC      hydrALAZINE (APRESOLINE) injection 10 mg, Q4H PRN      sodium chloride flush 0.9 % injection 10 mL, 2 times per day      sodium chloride flush 0.9 % injection 10 mL, PRN      thiamine (B-1) injection 100 mg, Daily      LORazepam (ATIVAN) tablet 1 mg, Q1H PRN    Or      LORazepam (ATIVAN) injection 1 mg, Q1H PRN    Or      LORazepam (ATIVAN) tablet 2 mg, Q1H PRN    Or      LORazepam (ATIVAN) injection 2 mg, Q1H PRN    Or      LORazepam (ATIVAN) tablet 3 mg, Q1H PRN    Or      LORazepam (ATIVAN) injection 3 mg, Q1H PRN    Or      LORazepam (ATIVAN) tablet 4 mg, Q1H PRN    Or      LORazepam (ATIVAN) injection 4 mg, Q1H PRN      sodium chloride flush 0.9 % injection 10 mL, 2 times per day      sodium chloride flush 0.9 % injection 10 mL, PRN      haloperidol lactate (HALDOL) injection 5 mg, Q4H PRN      0.9 % sodium chloride infusion, Continuous      multivitamin 1 tablet, Daily      sodium chloride flush 0.9 % injection 10 mL, 2 times per day      sodium chloride flush 0.9 % injection 10 mL, PRN      enoxaparin (LOVENOX) injection 40 mg, Daily      promethazine (PHENERGAN) tablet 12.5 mg, Q6H PRN    Or      ondansetron (ZOFRAN) injection 4 mg, Q6H PRN      polyethylene glycol (GLYCOLAX) packet 17 g, Daily PRN      acetaminophen (TYLENOL) tablet 650 mg, Q6H PRN    Or      acetaminophen (TYLENOL) suppository 650 mg, Q6H PRN        Objective:  BP (!) 179/86   Pulse 97   Temp 98 °F (36.7 °C) (Oral)   Resp 20   Ht 5' 9\" (1.753 m)   Wt 170 lb (77.1 kg)   SpO2 96%   BMI 25.10 kg/m²   No intake or output data in the 24 hours ending 01/06/21 1522   Wt Readings from Last 3 Encounters:   01/03/21 170 lb (77.1 kg)   03/04/18 166 lb 10.7 oz (75.6 kg)   05/10/16 170 lb (77.1 kg)       General appearance:  Appears comfortable  Eyes: Sclera clear. Pupils equal.  ENT: Moist oral mucosa. Trachea midline, no adenopathy. Cardiovascular: Regular rhythm, normal S1, S2. No murmur. No edema in lower extremities  Respiratory: Not using accessory muscles. Good inspiratory effort. Clear to auscultation bilaterally, no wheeze or crackles. GI: Abdomen soft, no tenderness, not distended, normal bowel sounds  Musculoskeletal: No cyanosis in digits, neck supple  Neurology: Grossly intact. No speech or motor deficits  Psych: Normal affect. Alert and oriented in time, place and person  Skin: Warm, dry, normal turgor  Extremity exam shows brisk capillary refill.   Peripheral pulses are palpable in lower extremities     Labs and Tests:  CBC:   Recent Labs     01/03/21  1604 01/04/21  1040 01/05/21  0617   WBC 15.0* 10.6 7.4   HGB 15.1 13.1* 12.2*    183 204     BMP:    Recent Labs     01/03/21  1604 01/04/21  1040 01/05/21  0617   * 137 139   K 3.8 4.1 4.2   CL 96* 103 105   CO2 25 23 28   BUN 34* 32* 24*   CREATININE 2.4* 2.1* 1.8*   GLUCOSE 99 93 94     Hepatic:   Recent Labs     01/03/21  1604   AST 14*   ALT 13   BILITOT 0.3   ALKPHOS 65     CT ABDOMEN PELVIS WO CONTRAST Additional Contrast? None   Final Result   No acute intra-abdominal or pelvic abnormality. Persistent circumferential thickening of the distal esophagus may be   secondary to a soft a giant is. A neoplastic abnormality cannot be excluded   and direct visualization or esophagram is recommended for further evaluation. Colonic diverticulosis. No evidence of diverticulitis         XR CHEST PORTABLE   Final Result   No acute cardiopulmonary disease. Problem List  Principal Problem:    RACIEL (acute kidney injury) ([address]  Active Problems:    Melena    Esophageal thickening    Alcohol abuse, continuous    Fever    Tobacco abuse  Resolved Problems:    * No resolved hospital problems. *       Assessment & Plan:   1. Cont IVF  2. Stop Rocephin IV  3. General diet per GI  4. Protonix PO bid  5. Urology consult for dark urine/?hematuria appreciated  6. CIWA Ativan PRN  7. Hydralazine IV PRN  8. Haldol IM PRN agitation    IV Access: Peripheral  Alarcon: No  Diet: DIET GENERAL;  Code:Full Code  DVT PPX Lovenox  Disposition Home    Discussed with patient and nursing. Need labs as ordered. Home once RACIEL resolved.       Brii Peralta MD   1/6/2021 3:22 PM

## 2021-01-06 NOTE — PLAN OF CARE
Problem: Daily Care:  Goal: Daily care needs are met  Description: Daily care needs are met  Outcome: Ongoing     Problem: Discharge Planning:  Goal: Patients continuum of care needs are met  Description: Patients continuum of care needs are met  Outcome: Ongoing

## 2021-01-06 NOTE — PROGRESS NOTES
Urology Progress Note  [facility]    Provider: Reza Patel APRN-CNP Patient ID:  Admission Date: 1/3/2021 Name: Tom Gonzalez  OR Date: 1/3/2021 MRN: [id number]   Patient Location: [id number] : 1975  Attending: Hira Issa MD Date of Service: 2021  PCP: No primary care provider on file. Tom Gonzalez is a 41 y.o. M with current daily alcohol and nicotine use. He presented to hospital with fever since Monday, gross hematuria, and dark tary stool. Was started on keflex for possible uti. Found to have esophageal thickening per CT A/P and Creatinine of 2.4. He has a 31 pack year smoking history, has smoked 1ppd since 14. No known family history of bladder Cancer. His gross hematuria has since resolved. He denies flank pain, nausea, dysuria, frequency, or suprapubic pain. Feeling better today. He said plan was to be discharged today. Again instructed to follow up.          Recommendations:  -Urine Culture negative for infection. -CT A/P showing grossly negative  pathology  -Needs cystoscopy on outpatient basis, Follow up has been arranged  -OK to d/c per urology when medically cleared. The patient had a chance to ask questions which were answered. he understands the above plan. Subjective:   Tom Gonzalez is a 41 y.o. male. He was seen and examined this morning. Today we discussed Follow up in office. Objective:   Vitals:  Vitals:    21 0845   BP: (!) 179/86   Pulse: 97   Resp: 20   Temp: 98 °F (36.7 °C)   SpO2: 96%     No intake or output data in the 24 hours ending 21 0944  Physical Exam:  Gen: Alert and oriented x3, no acute distress  CV: Regular rate   Resp: unlabored respirations  Abd: Soft, non-distended, non-tender, no masses  Ext: no peripheral edema noted, moves upper and lower extremities spontaneously  Skin: warmand well perfused, no rashes noted on the face, or arms.      Labs:  Lab Results   Component Value Date    WBC 7.4 2021    HGB 12.2 (L) 01/05/2021    HCT 37.6 (L) 01/05/2021    MCV 94.2 01/05/2021     01/05/2021     Lab Results   Component Value Date    CREATININE 1.8 (H) 01/05/2021    BUN 24 (H) 01/05/2021     01/05/2021    K 4.2 01/05/2021     01/05/2021    CO2 28 01/05/2021       Pretty SÁNCHEZ-CNP  1/6/2021

## 2021-01-07 ENCOUNTER — APPOINTMENT (OUTPATIENT)
Dept: ULTRASOUND IMAGING | Age: 46
DRG: 720 | End: 2021-01-07
Payer: MEDICAID

## 2021-01-07 VITALS
HEART RATE: 74 BPM | DIASTOLIC BLOOD PRESSURE: 103 MMHG | RESPIRATION RATE: 18 BRPM | HEIGHT: 69 IN | WEIGHT: 170 LBS | SYSTOLIC BLOOD PRESSURE: 175 MMHG | BODY MASS INDEX: 25.18 KG/M2 | TEMPERATURE: 97.3 F | OXYGEN SATURATION: 94 %

## 2021-01-07 LAB
ALBUMIN SERPL-MCNC: 2.8 G/DL (ref 3.4–5)
ANION GAP SERPL CALCULATED.3IONS-SCNC: 8 MMOL/L (ref 3–16)
ANION GAP SERPL CALCULATED.3IONS-SCNC: 8 MMOL/L (ref 3–16)
APTT: 32.8 SEC (ref 24.2–36.2)
BASOPHILS ABSOLUTE: 0.1 K/UL (ref 0–0.2)
BASOPHILS RELATIVE PERCENT: 0.9 %
BLOOD CULTURE, ROUTINE: NORMAL
BUN BLDV-MCNC: 13 MG/DL (ref 7–20)
BUN BLDV-MCNC: 14 MG/DL (ref 7–20)
C3 COMPLEMENT: 108.9 MG/DL (ref 90–180)
C4 COMPLEMENT: 14.4 MG/DL (ref 10–40)
CALCIUM SERPL-MCNC: 8.6 MG/DL (ref 8.3–10.6)
CALCIUM SERPL-MCNC: 8.6 MG/DL (ref 8.3–10.6)
CHLORIDE BLD-SCNC: 106 MMOL/L (ref 99–110)
CHLORIDE BLD-SCNC: 108 MMOL/L (ref 99–110)
CO2: 26 MMOL/L (ref 21–32)
CO2: 26 MMOL/L (ref 21–32)
CREAT SERPL-MCNC: 1.5 MG/DL (ref 0.9–1.3)
CREAT SERPL-MCNC: 1.6 MG/DL (ref 0.9–1.3)
CREATININE URINE: 68.9 MG/DL (ref 39–259)
CULTURE, BLOOD 2: NORMAL
EOSINOPHILS ABSOLUTE: 0.2 K/UL (ref 0–0.6)
EOSINOPHILS RELATIVE PERCENT: 2.3 %
GFR AFRICAN AMERICAN: 57
GFR AFRICAN AMERICAN: >60
GFR NON-AFRICAN AMERICAN: 47
GFR NON-AFRICAN AMERICAN: 51
GLUCOSE BLD-MCNC: 91 MG/DL (ref 70–99)
GLUCOSE BLD-MCNC: 95 MG/DL (ref 70–99)
HCT VFR BLD CALC: 35.7 % (ref 40.5–52.5)
HEMOGLOBIN: 11.7 G/DL (ref 13.5–17.5)
INR BLD: 1.03 (ref 0.86–1.14)
LYMPHOCYTES ABSOLUTE: 1.3 K/UL (ref 1–5.1)
LYMPHOCYTES RELATIVE PERCENT: 15.2 %
MCH RBC QN AUTO: 30.9 PG (ref 26–34)
MCHC RBC AUTO-ENTMCNC: 32.8 G/DL (ref 31–36)
MCV RBC AUTO: 94.2 FL (ref 80–100)
MONOCYTES ABSOLUTE: 0.6 K/UL (ref 0–1.3)
MONOCYTES RELATIVE PERCENT: 7.4 %
NEUTROPHILS ABSOLUTE: 6.3 K/UL (ref 1.7–7.7)
NEUTROPHILS RELATIVE PERCENT: 74.2 %
PDW BLD-RTO: 13.8 % (ref 12.4–15.4)
PHOSPHORUS: 4.2 MG/DL (ref 2.5–4.9)
PLATELET # BLD: 257 K/UL (ref 135–450)
PMV BLD AUTO: 7.8 FL (ref 5–10.5)
POTASSIUM SERPL-SCNC: 3.5 MMOL/L (ref 3.5–5.1)
POTASSIUM SERPL-SCNC: 3.6 MMOL/L (ref 3.5–5.1)
PROTEIN PROTEIN: 49 MG/DL
PROTHROMBIN TIME: 11.9 SEC (ref 10–13.2)
RBC # BLD: 3.79 M/UL (ref 4.2–5.9)
SODIUM BLD-SCNC: 140 MMOL/L (ref 136–145)
SODIUM BLD-SCNC: 142 MMOL/L (ref 136–145)
SODIUM URINE: 127 MMOL/L
TOTAL CK: 55 U/L (ref 39–308)
WBC # BLD: 8.6 K/UL (ref 4–11)

## 2021-01-07 PROCEDURE — 36415 COLL VENOUS BLD VENIPUNCTURE: CPT

## 2021-01-07 PROCEDURE — 6370000000 HC RX 637 (ALT 250 FOR IP): Performed by: UROLOGY

## 2021-01-07 PROCEDURE — 6370000000 HC RX 637 (ALT 250 FOR IP): Performed by: FAMILY MEDICINE

## 2021-01-07 PROCEDURE — 2709999900 HC NON-CHARGEABLE SUPPLY: Performed by: UROLOGY

## 2021-01-07 PROCEDURE — 80069 RENAL FUNCTION PANEL: CPT

## 2021-01-07 PROCEDURE — 6370000000 HC RX 637 (ALT 250 FOR IP): Performed by: INTERNAL MEDICINE

## 2021-01-07 PROCEDURE — 86255 FLUORESCENT ANTIBODY SCREEN: CPT

## 2021-01-07 PROCEDURE — 88112 CYTOPATH CELL ENHANCE TECH: CPT

## 2021-01-07 PROCEDURE — 80048 BASIC METABOLIC PNL TOTAL CA: CPT

## 2021-01-07 PROCEDURE — 85730 THROMBOPLASTIN TIME PARTIAL: CPT

## 2021-01-07 PROCEDURE — 85025 COMPLETE CBC W/AUTO DIFF WBC: CPT

## 2021-01-07 PROCEDURE — 2580000003 HC RX 258: Performed by: UROLOGY

## 2021-01-07 PROCEDURE — 3600000004 HC SURGERY LEVEL 4 BASE: Performed by: UROLOGY

## 2021-01-07 PROCEDURE — 0TJB8ZZ INSPECTION OF BLADDER, VIA NATURAL OR ARTIFICIAL OPENING ENDOSCOPIC: ICD-10-PCS | Performed by: UROLOGY

## 2021-01-07 PROCEDURE — 6360000002 HC RX W HCPCS: Performed by: INTERNAL MEDICINE

## 2021-01-07 PROCEDURE — 82550 ASSAY OF CK (CPK): CPT

## 2021-01-07 PROCEDURE — 2580000003 HC RX 258: Performed by: NURSE PRACTITIONER

## 2021-01-07 PROCEDURE — 76770 US EXAM ABDO BACK WALL COMP: CPT

## 2021-01-07 PROCEDURE — 82570 ASSAY OF URINE CREATININE: CPT

## 2021-01-07 PROCEDURE — 85610 PROTHROMBIN TIME: CPT

## 2021-01-07 PROCEDURE — 7100000010 HC PHASE II RECOVERY - FIRST 15 MIN: Performed by: UROLOGY

## 2021-01-07 PROCEDURE — 6370000000 HC RX 637 (ALT 250 FOR IP): Performed by: NURSE PRACTITIONER

## 2021-01-07 PROCEDURE — 84300 ASSAY OF URINE SODIUM: CPT

## 2021-01-07 PROCEDURE — 84156 ASSAY OF PROTEIN URINE: CPT

## 2021-01-07 PROCEDURE — 86038 ANTINUCLEAR ANTIBODIES: CPT

## 2021-01-07 PROCEDURE — 86160 COMPLEMENT ANTIGEN: CPT

## 2021-01-07 RX ORDER — PANTOPRAZOLE SODIUM 40 MG/1
40 TABLET, DELAYED RELEASE ORAL
Status: DISCONTINUED | OUTPATIENT
Start: 2021-01-07 | End: 2021-01-07 | Stop reason: SDUPTHER

## 2021-01-07 RX ORDER — LIDOCAINE HYDROCHLORIDE 20 MG/ML
JELLY TOPICAL
Status: COMPLETED | OUTPATIENT
Start: 2021-01-07 | End: 2021-01-07

## 2021-01-07 RX ORDER — CLONIDINE HYDROCHLORIDE 0.1 MG/1
0.1 TABLET ORAL ONCE
Status: COMPLETED | OUTPATIENT
Start: 2021-01-07 | End: 2021-01-07

## 2021-01-07 RX ORDER — CEPHALEXIN 250 MG/1
500 CAPSULE ORAL ONCE
Status: COMPLETED | OUTPATIENT
Start: 2021-01-07 | End: 2021-01-07

## 2021-01-07 RX ORDER — LANOLIN ALCOHOL/MO/W.PET/CERES
100 CREAM (GRAM) TOPICAL DAILY
Status: DISCONTINUED | OUTPATIENT
Start: 2021-01-07 | End: 2021-01-07 | Stop reason: HOSPADM

## 2021-01-07 RX ORDER — MAGNESIUM HYDROXIDE 1200 MG/15ML
LIQUID ORAL
Status: COMPLETED | OUTPATIENT
Start: 2021-01-07 | End: 2021-01-07

## 2021-01-07 RX ORDER — HYDRALAZINE HYDROCHLORIDE 100 MG/1
100 TABLET, FILM COATED ORAL EVERY 8 HOURS SCHEDULED
Status: DISCONTINUED | OUTPATIENT
Start: 2021-01-07 | End: 2021-01-07 | Stop reason: HOSPADM

## 2021-01-07 RX ORDER — AMLODIPINE BESYLATE 5 MG/1
10 TABLET ORAL DAILY
Status: DISCONTINUED | OUTPATIENT
Start: 2021-01-07 | End: 2021-01-07 | Stop reason: HOSPADM

## 2021-01-07 RX ADMIN — THIAMINE HYDROCHLORIDE 100 MG: 100 INJECTION, SOLUTION INTRAMUSCULAR; INTRAVENOUS at 12:00

## 2021-01-07 RX ADMIN — PANTOPRAZOLE SODIUM 40 MG: 40 TABLET, DELAYED RELEASE ORAL at 05:41

## 2021-01-07 RX ADMIN — THERA TABS 1 TABLET: TAB at 11:59

## 2021-01-07 RX ADMIN — HYDRALAZINE HYDROCHLORIDE 10 MG: 20 INJECTION INTRAMUSCULAR; INTRAVENOUS at 14:09

## 2021-01-07 RX ADMIN — ACETAMINOPHEN 650 MG: 325 TABLET ORAL at 05:44

## 2021-01-07 RX ADMIN — CLONIDINE HYDROCHLORIDE 0.1 MG: 0.1 TABLET ORAL at 01:57

## 2021-01-07 RX ADMIN — HYDRALAZINE HYDROCHLORIDE 100 MG: 100 TABLET, FILM COATED ORAL at 13:41

## 2021-01-07 RX ADMIN — HYDRALAZINE HYDROCHLORIDE 50 MG: 25 TABLET, FILM COATED ORAL at 05:41

## 2021-01-07 RX ADMIN — AMLODIPINE BESYLATE 10 MG: 5 TABLET ORAL at 11:59

## 2021-01-07 RX ADMIN — SODIUM CHLORIDE: 9 INJECTION, SOLUTION INTRAVENOUS at 05:42

## 2021-01-07 RX ADMIN — CEPHALEXIN 500 MG: 250 CAPSULE ORAL at 10:50

## 2021-01-07 ASSESSMENT — PAIN DESCRIPTION - PAIN TYPE: TYPE: ACUTE PAIN

## 2021-01-07 ASSESSMENT — PAIN DESCRIPTION - LOCATION: LOCATION: NECK

## 2021-01-07 ASSESSMENT — PAIN SCALES - GENERAL
PAINLEVEL_OUTOF10: 3
PAINLEVEL_OUTOF10: 0
PAINLEVEL_OUTOF10: 0

## 2021-01-07 ASSESSMENT — PAIN DESCRIPTION - DESCRIPTORS: DESCRIPTORS: ACHING

## 2021-01-07 ASSESSMENT — PAIN DESCRIPTION - ORIENTATION: ORIENTATION: MID

## 2021-01-07 NOTE — PLAN OF CARE
Problem: Bleeding:  Goal: Will show no signs and symptoms of excessive bleeding  Description: Will show no signs and symptoms of excessive bleeding  Outcome: Completed     Problem: Daily Care:  Goal: Daily care needs are met  Description: Daily care needs are met  Outcome: Completed     Problem: Discharge Planning:  Goal: Patients continuum of care needs are met  Description: Patients continuum of care needs are met  Outcome: Completed

## 2021-01-07 NOTE — PROGRESS NOTES
Urology Progress Note  [facility]    Provider: Toby Cain APRN-CNP Patient ID:  Admission Date: 1/3/2021 Name: Florentino Clark  OR Date: 1/3/2021 MRN: [id number]   Patient Location: OR/NONE : 1975  Attending: Dominik Barbosa MD Date of Service: 2021  PCP: No primary care provider on file. Diagnoses:  Gross Hematuria    Assessment/Plan:  Florentino Clark is a 41 y.o. M with current daily alcohol and nicotine use. He presented to hospital with fever since Monday, gross hematuria, and dark tary stool. Was started on keflex for possible uti. Found to have esophageal thickening per CT A/P and Creatinine of 2.4. He has a 31 pack year smoking history, has smoked 1ppd since 14. No known family history of bladder Cancer. His gross hematuria has since resolved.        Recommendations:  -Urine Culture negative for infection. -CT A/P showing grossly negative  pathology  -Patient in OR today for cystoscopy with Dr. Radha Clement. -OK to d/c per urology when medically cleared. The patient had a chance to ask questions which were answered. he understands the above plan. Subjective:   Florentino Clark is a 41 y.o. male. He was seen and examined this morning. Today we discussed cysto. Objective:   Vitals:  Vitals:    21 0540   BP: (!) 169/83   Pulse: 91   Resp: 16   Temp: 98 °F (36.7 °C)   SpO2: 94%     No intake or output data in the 24 hours ending 21 0942  Physical Exam:  Gen: Alert and oriented x3, no acute distress  CV: Regular rate   Resp: unlabored respirations  Abd: Soft, non-distended, non-tender, no masses  Ext: no peripheral edema noted, moves upper and lower extremities spontaneously  Skin: warmand well perfused, no rashes noted on the face, or arms.      Labs:  Lab Results   Component Value Date    WBC 8.6 2021    HGB 11.7 (L) 2021    HCT 35.7 (L) 2021    MCV 94.2 2021     2021     Lab Results   Component Value Date    CREATININE 1.6 (H) 2021

## 2021-01-07 NOTE — PROGRESS NOTES
D: Patient's blood pressure is 173/102 after Hydralazine 10 mg IVP given at 17:50. Dr. Mirtha Esparza was perfect served and made aware of patient's blood pressure prior to the PRN Hydralazine dose. He also received Norvasc 5 mg and Hydralazine 50 mg earlier today as new medications. We are still waiting on the results of the CMP to Creatinine results because of lab equipment failure. Pt is dozing quietly in bed, no change in assessment. A: Perfect served Dr. Irena Henry. R: Will give Marcus Hensley RN night shift report and continue to monitor the patient. ICU

## 2021-01-07 NOTE — PROGRESS NOTES
Patient/report received from [facility], vss, a/o, denies pain, will transfer to room

## 2021-01-07 NOTE — PROGRESS NOTES
At [facility] pt returned to sign AMA paperwork. Stated he had to go home and take a shower, then remembered he needed to sign paperwork and came back.

## 2021-01-07 NOTE — PROGRESS NOTES
Pt continues to state he is not willing to stay another day. RN explained to patient that his doctors did not believe it was in his best interest to be discharged, and that it would be beneficial to complete ordered testing. Pt expressed understanding, and stated he would be finding a PCP try to follow up with. Explained acute nature of disease and risks of leaving AMA. Attending made aware of pt decision to leave. Pt expressed understanding and requested IV be removed. IV removed without issue. Writer left room, informing pt they would return with AMA paperwork to sign, upon return to room pt was not present. Pt did not sign AMA paperwork, walked out of hospital unassisted.

## 2021-01-07 NOTE — PROGRESS NOTES
2025: Shift assessment completed, pt is alert and oriented, pt is hypertensive, scheduled meds given, independent in room. Call light within reach, denies any pain or other needs at this time, pt states he will leave AMA tomorrow if he's not d/c. See flowsheets and MAR. Will sanjuanalilibeth    0011: Perfect serve to hospitalist:    [phone] Hospital or Facility: [facility] From: [facility] RE: Harris Aguilar 1975 RM: [id number] FYI, Patient is still hypertensive after giving PO and IV Hydralazine. Patient denies pain, headache or any discomfort and this time. Last BP checked 183/103, HR 98. Do you want to add another BP medicine or keep monitoring patient? Please advice, thank you. Need Callback: NO CALLBACK REQ 4T ORTHO/NEURO ROUTINE    Waiting for response. -Ix Catapres ordered.

## 2021-01-07 NOTE — CONSULTS
Nephrology Consult Note  [phone]  [phone]   http://[facility].        Reason for Consult:  RACIEL     HISTORY OF PRESENT ILLNESS:      The patient is a 41 y.o. male with significant past medical history of HTN who presents with gross hematuria   Has 3 + proteinuria and a raised creatinine   Says he has had a sore throat each time he has had hematuria   No skin rash / no arthralgias  Denies using NSAIDs/ heroin/ OTC nasal decongestants  Thickened esophagus - is seeing GI       Past Medical History:        Diagnosis Date    Hypertension    Duration - 20 Years , non compliant with medications     Past Surgical History:        Procedure Laterality Date    UPPER GASTROINTESTINAL ENDOSCOPY N/A 1/5/2021    EGD DIAGNOSTIC ONLY performed by Palmira Riggs MD at [address]       Current Medications:    No current facility-administered medications on file prior to encounter. Current Outpatient Medications on File Prior to Encounter   Medication Sig Dispense Refill    cephALEXin (KEFLEX) 500 MG capsule Take 500 mg by mouth 3 times daily      multivitamin-iron-minerals-folic acid (CENTRUM) chewable tablet Take 1 tablet by mouth daily      ondansetron (ZOFRAN) 4 MG tablet Take 1 tablet by mouth every 8 hours as needed for Nausea or Vomiting 12 tablet 0       Allergies:  Patient has no known allergies.     Social History:    Social History     Socioeconomic History    Marital status: Single     Spouse name: Not on file    Number of children: Not on file    Years of education: Not on file    Highest education level: Not on file   Occupational History    Not on file   Social Needs    Financial resource strain: Not on file    Food insecurity     Worry: Not on file     Inability: Not on file    Transportation needs     Medical: Not on file     Non-medical: Not on file   Tobacco Use    Smoking status: Current Every Day Smoker     Packs/day: 1.00     Years: 25.00     Pack years: 25.00     Types: Cigarettes    Smokeless tobacco: Never Used   Substance and Sexual Activity    Alcohol use: Yes     Alcohol/week: 12.0 standard drinks     Types: 12 Shots of liquor per week     Comment: every day Vodka tonics    Drug use: No    Sexual activity: Never   Lifestyle    Physical activity     Days per week: Not on file     Minutes per session: Not on file    Stress: Not on file   Relationships    Social connections     Talks on phone: Not on file     Gets together: Not on file     Attends Jain service: Not on file     Active member of club or organization: Not on file     Attends meetings of clubs or organizations: Not on file     Relationship status: Not on file    Intimate partner violence     Fear of current or ex partner: Not on file     Emotionally abused: Not on file     Physically abused: Not on file     Forced sexual activity: Not on file   Other Topics Concern    Not on file   Social History Narrative    Not on file       Family History:   History reviewed. No pertinent family history. REVIEW OF SYSTEMS:      10 pt ROS done, relevant features as in [place], rest negative       PHYSICAL EXAM:    Vitals:    BP (!) 169/83   Pulse 91   Temp 98 °F (36.7 °C) (Oral)   Resp 16   Ht 5' 9\" (1.753 m)   Wt 170 lb (77.1 kg)   SpO2 94%   BMI 25.10 kg/m²   No intake/output data recorded. No intake/output data recorded. Physical Exam:  Gen:  alert, oriented x 3  PERRL , EOM +  Pallor +, No icterus  JVP not raised   CV: RRR no murmur or rub . Lungs:B/ L air entry, Normal breath sounds   Abd: soft, bowel sounds + , No organomegaly   Ext: No edema, no cyanosis  Skin: Warm.   No rash  Neuro: nonfocal.      DATA:    CBC with Differential:    Lab Results   Component Value Date    WBC 8.6 01/07/2021    RBC 3.79 01/07/2021    HGB 11.7 01/07/2021    HCT 35.7 01/07/2021     01/07/2021    MCV 94.2 01/07/2021    MCH 30.9 01/07/2021    MCHC 32.8 01/07/2021    RDW 13.8 01/07/2021    BANDSPCT 2 01/03/2021    LYMPHOPCT 15.2 01/07/2021    MONOPCT 7.4 01/07/2021    MYELOPCT 1 01/03/2021    BASOPCT 0.9 01/07/2021    MONOSABS 0.6 01/07/2021    LYMPHSABS 1.3 01/07/2021    EOSABS 0.2 01/07/2021    BASOSABS 0.1 01/07/2021     CMP:    Lab Results   Component Value Date     01/07/2021    K 3.6 01/07/2021    K 3.8 01/03/2021     01/07/2021    CO2 26 01/07/2021    BUN 13 01/07/2021    CREATININE 1.6 01/07/2021    GFRAA 57 01/07/2021    AGRATIO 0.9 01/03/2021    LABGLOM 47 01/07/2021    GLUCOSE 95 01/07/2021    PROT 7.1 01/03/2021    LABALBU 3.4 01/03/2021    CALCIUM 8.6 01/07/2021    BILITOT 0.3 01/03/2021    ALKPHOS 65 01/03/2021    AST 14 01/03/2021    ALT 13 01/03/2021     Magnesium:  No results found for: MG  Phosphorus:  No results found for: PHOS  Uric Acid:  No results found for: LABURIC, URICACID  Troponin:  No results found for: TROPONINI  U/A:    Lab Results   Component Value Date    COLORU Brown 01/03/2021    PROTEINU >=300 01/03/2021    PHUR 7.0 01/04/2021    LABCAST 1-3 Coarse Gran 03/04/2018    WBCUA 10-20 01/03/2021    RBCUA >100 01/03/2021    BACTERIA 3+ 01/03/2021    CLARITYU CLOUDY 01/03/2021    SPECGRAV >=1.030 01/03/2021    LEUKOCYTESUR Negative 01/03/2021    UROBILINOGEN 1.0 01/03/2021    BILIRUBINUR MODERATE 01/03/2021    BLOODU LARGE 01/03/2021    GLUCOSEU Negative 01/03/2021           IMPRESSION/RECOMMENDATIONS:      1. RACIEL/ CKD with hematuria and Proteinuria    ? Nephritic syndrome, IgA nephropathy most likely    Serology    PT/ PTT    Kidney Biopsy  2. HTN  3. Gross hematuria with proteinuria    NORMAL cystoscopy - Dr costa     Thank you for allowing me to participate in the care of this patient. I will continue to follow along. Please call with questions.     Shantal Padilla MD  1/7/2021  [facility]

## 2021-01-07 NOTE — OP NOTE
Urology Operative Note  [facility]     Patient: Alexander Brannon MRN: [id number]  Room/Bed: 9-7536/4406-55   YOB: 1975  Age/Sex: 41 y.o.male  Admission Date: 1/3/2021     Date of Operation: 1/7/2021    Preoperative Diagnosis: Gross hematuria    Postoperative Diagnosis: same    Procedure:    1. Flexible cystoscopy    Surgeon:   Renea Torres MD, SRINI    Anesthesia: MAC    Indications: Alexander Brannon is a 41 y.o. male who presents for the above named surgery. Informed consent was obtained and the risks, benefits, and details of the procedure were explained to the patient who elected to proceed. Details of Procedure: The patient was brought to the operating room and placed in the supine position on the operating room table. SCDs were placed on the lower extremities. The patient was positioned in a supine position, all pressure points were padded, and the genitals were prepped and draped in the usual sterile fashion with administration of local anesthesia with lidocaine jelly. A routine timeout was performed, confirming the patient, procedure, site, risk of fire, patient allergies and confirming that preoperative antibiotics had been administered prior to beginning. A flexible cystoscope was advance via a normal appearing urethra into the bladder. There was no evidence of urethral strictures nor significant prostatic enlargment. The bladder was inspected and there were no suspicious lesions, stones or diverticula seen. The bilateral ureteral orifices were in their orthotopic locations and demonstrated efflux of clear yellow urine. On retroflexion of the cystoscope the remaining portions of the bladder were also normal.  The cystoscope was then removed and urethra re-examined with no significant findings noted. At the end of the procedure all counts were correct. The patient tolerated the procedure well and was transported to the PACU in stable condition.     Findings: Normal cystoscopy    Estimated Blood Loss: None                 Drains: None          Specimens: None    Complications: None apparent           Disposition:  PACU - stable    Plan: Follow-up urine cytology, otherwise completion of negative hematuria workup, routine urologic followup           Electronically signed by: Venu Sales MD, SRINI, 1/7/2021  [facility]  Office Contact: [phone]

## 2021-01-07 NOTE — PROGRESS NOTES
Pt returned from ultrasound and cystoscopy, BP remains elevated, given scheduled norvasc. Otherwise VSS, pt alert and oriented, denies pain or any needs at this time. Pt voided without issue upon returning to room, declined to use urinal to measure output.  1:02 PM  Pt refusing 24 hour urine collection, states he will not stay another night. Educated pt on indication for test, pt states \"I don't have to be here for that. \" Notified nephrology. Per ultrasound, the ordered retroperitoneal ultrasound is the same as the renal ultrasound pt had this morning. 1:47 PM  BP remains elevated, 183/109 given scheduled hydralazine. Will recheck BP and give PRN IV hydralazine if indicated. Educated pt on elevated blood pressure and risks of going home hypertensive. Pt stated, \"It's probably high because I'm frustrated about still being here waiting, I'll be better at home. \"

## 2021-01-07 NOTE — PROGRESS NOTES
[facility] PROGRESS NOTE    1/7/2021 2:26 PM        Name: Geeta Macias . Admitted: 1/3/2021  Primary Care Provider: No primary care provider on file. (Tel: None)    Brief Course:  40 yo M with history of alcohol abuse, cocaine abuse came to ER with complaints of fever, melena and dark urine. Patient had been diagnosed with UTI at urgent care, started on Abx as OP and was using Ibuprofen. Admitted as inpatient for melena, UTI with sepsis, RACIEL and esophageal thickening on CT Ab/P.  COVID negative on 1/4. Followed by GI and Urology. Started on IVF and IV Abx. S/P EGD on 1/5/21. Findings:   Mild distal esophagitis, 3-4 cm HH, mild gastritis      Plans:  Oral PPI BID for 8 weeks and then daily thereafter. Absolutely no NSAIDs. GI will sign off. Please call if you have questions. Cysto on 1/7 Normal cystoscopy. Renal consulted for RACIEL vs CKD on 1/7. CC:  Melena, dark urine    Subjective:  . Patient denies melena. No CP, SOB, HA or abdominal pain.       Reviewed interval ancillary notes    Current Medications      hydrALAZINE (APRESOLINE) tablet 100 mg, 3 times per day      amLODIPine (NORVASC) tablet 10 mg, Daily      thiamine tablet 100 mg, Daily      pantoprazole (PROTONIX) tablet 40 mg, BID AC      hydrALAZINE (APRESOLINE) injection 10 mg, Q4H PRN      sodium chloride flush 0.9 % injection 10 mL, 2 times per day      sodium chloride flush 0.9 % injection 10 mL, PRN      LORazepam (ATIVAN) tablet 1 mg, Q1H PRN    Or      LORazepam (ATIVAN) injection 1 mg, Q1H PRN    Or      LORazepam (ATIVAN) tablet 2 mg, Q1H PRN    Or      LORazepam (ATIVAN) injection 2 mg, Q1H PRN    Or      LORazepam (ATIVAN) tablet 3 mg, Q1H PRN    Or      LORazepam (ATIVAN) injection 3 mg, Q1H PRN    Or      LORazepam (ATIVAN) tablet 4 mg, Q1H PRN    Or      LORazepam (ATIVAN) injection 4 mg, Q1H PRN      sodium chloride flush 0.9 % injection 10 mL, 2 times per day      sodium chloride flush 0.9 % injection 10 mL, PRN      haloperidol lactate (HALDOL) injection 5 mg, Q4H PRN      0.9 % sodium chloride infusion, Continuous      multivitamin 1 tablet, Daily      sodium chloride flush 0.9 % injection 10 mL, 2 times per day      sodium chloride flush 0.9 % injection 10 mL, PRN      enoxaparin (LOVENOX) injection 40 mg, Daily      promethazine (PHENERGAN) tablet 12.5 mg, Q6H PRN    Or      ondansetron (ZOFRAN) injection 4 mg, Q6H PRN      polyethylene glycol (GLYCOLAX) packet 17 g, Daily PRN      acetaminophen (TYLENOL) tablet 650 mg, Q6H PRN    Or      acetaminophen (TYLENOL) suppository 650 mg, Q6H PRN        Objective:  BP (!) 175/103   Pulse 74   Temp 97.3 °F (36.3 °C) (Axillary)   Resp 18   Ht 5' 9\" (1.753 m)   Wt 170 lb (77.1 kg)   SpO2 94%   BMI 25.10 kg/m²   No intake or output data in the 24 hours ending 01/07/21 1426   Wt Readings from Last 3 Encounters:   01/03/21 170 lb (77.1 kg)   03/04/18 166 lb 10.7 oz (75.6 kg)   05/10/16 170 lb (77.1 kg)       General appearance:  Appears comfortable  Eyes: Sclera clear. Pupils equal.  ENT: Moist oral mucosa. Trachea midline, no adenopathy. Cardiovascular: Regular rhythm, normal S1, S2. No murmur. No edema in lower extremities  Respiratory: Not using accessory muscles. Good inspiratory effort. Clear to auscultation bilaterally, no wheeze or crackles. GI: Abdomen soft, no tenderness, not distended, normal bowel sounds  Musculoskeletal: No cyanosis in digits, neck supple  Neurology: Grossly intact. No speech or motor deficits  Psych: Normal affect. Alert and oriented in time, place and person  Skin: Warm, dry, normal turgor  Extremity exam shows brisk capillary refill.   Peripheral pulses are palpable in lower extremities     Labs and Tests:  CBC:   Recent Labs     01/05/21  0617 01/06/21  1554 01/07/21  0555   WBC 7.4 8.0 8.6   HGB 12.2* 13.1* 11.7*    236 257     BMP:    Recent Labs     01/06/21  1554 01/07/21  0555 01/07/21  1334    142 140   K 4.1 3.6 3.5    108 106   CO2 25 26 26   BUN 16 13 14   CREATININE 1.6* 1.6* 1.5*   GLUCOSE 106* 95 91     Hepatic:   No results for input(s): AST, ALT, ALB, BILITOT, ALKPHOS in the last 72 hours. US RENAL COMPLETE   Final Result   Unremarkable renal ultrasound. CT ABDOMEN PELVIS WO CONTRAST Additional Contrast? None   Final Result   No acute intra-abdominal or pelvic abnormality. Persistent circumferential thickening of the distal esophagus may be   secondary to a soft a giant is. A neoplastic abnormality cannot be excluded   and direct visualization or esophagram is recommended for further evaluation. Colonic diverticulosis. No evidence of diverticulitis         XR CHEST PORTABLE   Final Result   No acute cardiopulmonary disease. US RETROPERITONEAL COMPLETE    (Results Pending)   CT GUIDED NEEDLE PLACEMENT    (Results Pending)         Problem List  Principal Problem:    RACIEL (acute kidney injury) ([address]  Active Problems:    Melena    Esophageal thickening    Alcohol abuse, continuous    Fever    Tobacco abuse  Resolved Problems:    * No resolved hospital problems. *       Assessment & Plan:   1. Cont IVF  2. Renal consult for RACIEL vs CKD  3. General diet per GI; NPO p MN for kidney bx  4. Protonix PO bid  5. Urology consult for dark urine/?hematuria appreciated  6. CIWA Ativan PRN  7. Hydralazine IV PRN  8. Haldol IM PRN agitation    IV Access: Peripheral  Alarcon: No  Diet: DIET GENERAL;  Diet NPO, After Midnight  Code:Full Code  DVT PPX Lovenox  Disposition Home    Discussed with patient, CM and nursing. He merits kidney biopsy for RACIEL work up.       Edwin Whitaker MD   1/7/2021 2:26 PM

## 2021-01-08 LAB — ANTI-NUCLEAR ANTIBODY (ANA): NEGATIVE

## 2021-01-08 NOTE — DISCHARGE SUMMARY
Hospital Medicine Discharge Summary    Patient: Wayne Severino     Gender: male  : 1975   Age: 41 y.o. MRN: [id number]    Admitting Physician: Natalie Francisco MD  Discharge Physician: Selene Ware MD     Code Status: Full code    Admit Date: 1/3/2021   LEFT AMA Date: 2021      Disposition:  AMA    Discharge Diagnoses: Active Hospital Problems    Diagnosis Date Noted    Melena [K92.1] 2021    Esophageal thickening [K22.8] 2021    Alcohol abuse, continuous [F10.10] 2021    Fever [R50.9] 2021    Tobacco abuse [Z72.0] 2021    RACIEL (acute kidney injury) ([facility] [address] [N17.9] 2021         Condition at Discharge:  [facility] Course:   42 yo M with history of alcohol abuse, cocaine abuse came to ER with complaints of fever, melena and dark urine. Patient had been diagnosed with UTI at urgent care, started on Abx as OP and was using Ibuprofen. Admitted as inpatient for melena, UTI with sepsis, RACIEL and esophageal thickening on CT Ab/P.  COVID negative on . Followed by GI and Urology. Started on IVF and IV Abx. S/P EGD on 21. Findings:   Mild distal esophagitis, 3-4 cm HH, mild gastritis      Plans:  Oral PPI BID for 8 weeks and then daily thereafter.  Absolutely no NSAIDs.  GI will sign off. Please call if you have questions.     Cysto on  Normal cystoscopy. Renal consulted for RACIEL vs CKD on . Recommended kidney biopsy. He left AMA.     Discharge Medications:   Discharge Medication List as of 2021  4:19 PM        Discharge Medication List as of 2021  4:19 PM        Discharge Medication List as of 2021  4:19 PM      CONTINUE these medications which have NOT CHANGED    Details   cephALEXin (KEFLEX) 500 MG capsule Take 500 mg by mouth 3 times dailyHistorical Med      multivitamin-iron-minerals-folic acid (CENTRUM) chewable tablet Take 1 tablet by mouth dailyHistorical Med      ondansetron (ZOFRAN) 4 MG tablet Take 1 tablet by mouth every 8 hours as needed for Nausea or Vomiting, Disp-12 tablet, R-0Print           Discharge Medication List as of 1/7/2021  4:19 PM          Discharge Exam:    BP (!) 175/103   Pulse 74   Temp 97.3 °F (36.3 °C) (Axillary)   Resp 18   Ht 5' 9\" (1.753 m)   Wt 170 lb (77.1 kg)   SpO2 94%   BMI 25.10 kg/m²   General appearance:  Appears comfortable  Eyes: Sclera clear. Pupils equal.  ENT: Moist oral mucosa. Trachea midline, no adenopathy. Cardiovascular: Regular rhythm, normal S1, S2. No murmur. No edema in lower extremities  Respiratory: Not using accessory muscles. Good inspiratory effort. Clear to auscultation bilaterally, no wheeze or crackles. GI: Abdomen soft, no tenderness, not distended, normal bowel sounds  Musculoskeletal: No cyanosis in digits, neck supple  Neurology: Grossly intact. No speech or motor deficits  Psych: Normal affect. Alert and oriented in time, place and person  Skin: Warm, dry, normal turgor  Extremity exam shows brisk capillary refill. Peripheral pulses are palpable in lower extremities     Labs:  For convenience and continuity at follow-up the following most recent labs are provided:    Lab Results   Component Value Date    WBC 8.6 01/07/2021    HGB 11.7 01/07/2021    HCT 35.7 01/07/2021    MCV 94.2 01/07/2021     01/07/2021     01/07/2021    K 3.5 01/07/2021    K 3.8 01/03/2021     01/07/2021    CO2 26 01/07/2021    BUN 14 01/07/2021    CREATININE 1.5 01/07/2021    CALCIUM 8.6 01/07/2021    PHOS 4.2 01/07/2021    ALKPHOS 65 01/03/2021    ALT 13 01/03/2021    AST 14 01/03/2021    BILITOT 0.3 01/03/2021    LABALBU 2.8 01/07/2021     Lab Results   Component Value Date    INR 1.03 01/07/2021    INR 1.20 (H) 01/03/2021       Radiology:  Ct Abdomen Pelvis Wo Contrast Additional Contrast? None    Result Date: 1/3/2021  EXAMINATION: CT OF THE ABDOMEN AND PELVIS WITHOUT CONTRAST 1/3/2021 5:28 pm TECHNIQUE: CT of the abdomen and pelvis was performed without the administration of intravenous contrast. Multiplanar reformatted images are provided for review. Dose modulation, iterative reconstruction, and/or weight based adjustment of the mA/kV was utilized to reduce the radiation dose to as low as reasonably achievable. COMPARISON: 03/04/2018 HISTORY: ORDERING SYSTEM PROVIDED HISTORY: hematuria, GI bleeding, leukocytosis TECHNOLOGIST PROVIDED HISTORY: Reason for exam:->hematuria, GI bleeding, leukocytosis Additional Contrast?->None Reason for Exam: Fever (pt states he has been having fever since Monday. Today pt noticed his urine is dark and his stools are tarry. denies pain. pt states he has been taking motrin and aleve FINDINGS: Lower Chest: The lung bases are clear Organs: The solid organs of the abdomen are unremarkable for a noncontrast exam.  The gallbladder appears within normal limits. GI/Bowel: Again, there is mild circumferential thickening of the distal esophagus. There are scattered diverticula projecting off of the sigmoid colon. There is no evidence of bowel wall thickening, inflammation or free fluid. There is no bowel obstruction. Pelvis: There is no free fluid Peritoneum/Retroperitoneum: The abdominal aorta and iliac arteries are normal in caliber. There is no pathologic adenopathy. Bones/Soft Tissues: There is a small left inguinal hernia containing fat similar to the prior exam.  There is a periumbilical hernia containing fat which is also stable. No acute intra-abdominal or pelvic abnormality. Persistent circumferential thickening of the distal esophagus may be secondary to a soft a giant is. A neoplastic abnormality cannot be excluded and direct visualization or esophagram is recommended for further evaluation. Colonic diverticulosis.   No evidence of diverticulitis     Us Renal Complete    Result Date: 1/7/2021  EXAMINATION: RETROPERITONEAL ULTRASOUND OF THE KIDNEYS AND URINARY BLADDER 1/7/2021 COMPARISON: CT 01/03/2021 HISTORY: ORDERING SYSTEM PROVIDED HISTORY: RACIEL TECHNOLOGIST PROVIDED HISTORY: Reason for exam:->RACIEL Reason for Exam: uti hematuria Acuity: Unknown Type of Exam: Initial FINDINGS: Kidneys: The right kidney measures 12.8 cm in length and the left kidney measures 12.9 cm in length. Kidneys demonstrate normal cortical echogenicity. No evidence of hydronephrosis or intrarenal stones. Bladder: Estimated bladder volume 132 mL with no focal abnormality identified     Unremarkable renal ultrasound. Xr Chest Portable    Result Date: 1/3/2021  EXAMINATION: ONE XRAY VIEW OF THE CHEST 1/3/2021 2:31 pm COMPARISON: None. HISTORY: ORDERING SYSTEM PROVIDED HISTORY: fever TECHNOLOGIST PROVIDED HISTORY: Reason for exam:->fever Reason for Exam: Fever (pt states he has been having fever since Monday. Today pt noticed his urine is dark and his stools are tarry. denies pain. pt states he has been taking motrin and aleve) Acuity: Acute Type of Exam: Initial FINDINGS: The cardiac silhouette, mediastinal and hilar contours are normal.  The lungs are clear. There are no pleural effusions. No acute osseous abnormalities are identified. No acute cardiopulmonary disease. The patient was seen and examined on day of discharge and this discharge summary is in conjunction with any daily progress note from day of discharge. Time Spent on discharge is 45 minutes  in the examination, evaluation, counseling and review of medications and discharge plan. Note that more than 30 minutes was spent in preparing discharge papers, discussing discharge with patient, medication review, etc.       Signed:    Jeff Sen MD   1/8/2021      Thank you No primary care provider on file. for the opportunity to be involved in this patient's care.  If you have any questions or concerns please feel free to contact me at [address] [place] [address]

## 2021-01-09 LAB — ANCA IFA: NORMAL

## (undated) DEVICE — Device: Brand: MEDEX

## (undated) DEVICE — Z DUP USE 2522782 SOLUTION IRRIG 1000ML STRL H2O PLAS CONTAINER UROMATIC

## (undated) DEVICE — SET VLV 3 PC AWS DISPOSABLE GRDIAN SCOPEVALET

## (undated) DEVICE — MOUTHPIECE ENDOSCP L CTRL OPN AND SIDE PORTS DISP

## (undated) DEVICE — GLOVE ORANGE PI 7   MSG9070

## (undated) DEVICE — SYRINGE MED 10ML SLIP TIP BLNT FILL AND LUERLOCK DISP

## (undated) DEVICE — CYSTO PACK: Brand: MEDLINE INDUSTRIES, INC.

## (undated) DEVICE — CYSTO/BLADDER IRRIGATION SET, REGULATING CLAMP

## (undated) DEVICE — TRAY PREP DRY W/ PREM GLV 2 APPL 6 SPNG 2 UNDPD 1 OVERWRAP

## (undated) DEVICE — PROCEDURE KIT ENDOSCP CUST

## (undated) DEVICE — BW-412T DISP COMBO CLEANING BRUSH: Brand: SINGLE USE COMBINATION CLEANING BRUSH

## (undated) DEVICE — SOLUTION IV IRRIG WATER 1000ML POUR BRL 2F7114

## (undated) DEVICE — GOWN AURORA NONREINF LG: Brand: MEDLINE INDUSTRIES, INC.

## (undated) DEVICE — SOLUTION IV IRRIG WATER 500ML POUR BRL ST 2F7113